# Patient Record
Sex: FEMALE | Race: ASIAN | HISPANIC OR LATINO | Employment: FULL TIME | URBAN - METROPOLITAN AREA
[De-identification: names, ages, dates, MRNs, and addresses within clinical notes are randomized per-mention and may not be internally consistent; named-entity substitution may affect disease eponyms.]

---

## 2017-03-28 ENCOUNTER — OFFICE VISIT (OUTPATIENT)
Dept: URGENT CARE | Facility: URGENT CARE | Age: 22
End: 2017-03-28
Payer: COMMERCIAL

## 2017-03-28 VITALS
SYSTOLIC BLOOD PRESSURE: 110 MMHG | DIASTOLIC BLOOD PRESSURE: 74 MMHG | TEMPERATURE: 98.2 F | HEART RATE: 80 BPM | OXYGEN SATURATION: 99 % | WEIGHT: 111.9 LBS

## 2017-03-28 DIAGNOSIS — N76.0 BV (BACTERIAL VAGINOSIS): ICD-10-CM

## 2017-03-28 DIAGNOSIS — R30.0 DYSURIA: Primary | ICD-10-CM

## 2017-03-28 DIAGNOSIS — B96.89 BV (BACTERIAL VAGINOSIS): ICD-10-CM

## 2017-03-28 LAB
BETA HCG QUAL IFA URINE: NEGATIVE
MICRO REPORT STATUS: ABNORMAL
SPECIMEN SOURCE: ABNORMAL
WET PREP SPEC: ABNORMAL

## 2017-03-28 PROCEDURE — 99213 OFFICE O/P EST LOW 20 MIN: CPT | Performed by: PHYSICIAN ASSISTANT

## 2017-03-28 PROCEDURE — 87210 SMEAR WET MOUNT SALINE/INK: CPT | Performed by: PHYSICIAN ASSISTANT

## 2017-03-28 PROCEDURE — 84703 CHORIONIC GONADOTROPIN ASSAY: CPT | Performed by: PHYSICIAN ASSISTANT

## 2017-03-28 PROCEDURE — 87491 CHLMYD TRACH DNA AMP PROBE: CPT | Performed by: PHYSICIAN ASSISTANT

## 2017-03-28 PROCEDURE — 87591 N.GONORRHOEAE DNA AMP PROB: CPT | Performed by: PHYSICIAN ASSISTANT

## 2017-03-28 RX ORDER — METRONIDAZOLE 500 MG/1
500 TABLET ORAL 2 TIMES DAILY
Qty: 14 TABLET | Refills: 0 | Status: SHIPPED | OUTPATIENT
Start: 2017-03-28 | End: 2017-06-01

## 2017-03-28 NOTE — MR AVS SNAPSHOT
After Visit Summary   3/28/2017    Josselyn Ltaif    MRN: [id number]           Patient Information     Date Of Birth          1995        Visit Information        Provider Department      3/28/2017 8:30 PM Mariela Olivo PA-C [facility]        Today's Diagnoses     Dysuria    -  1    BV (bacterial vaginosis)          Care Instructions      Bacterial Vaginosis    You have a vaginal infection called bacterial vaginosis (BV). Both good and bad bacteria are present in a healthy vagina. BV occurs when these bacteria get out of balance. The number of bad bacteria increase. And the number of good bacteria decrease.  BV may or may not cause symptoms. If symptoms do occur, they can include:    Thin, gray, milky-white, or sometimes green discharge    Unpleasant odor or  fishy  smell    Itching, burning, or pain in or around the vagina  It is not known what causes BV, but certain factors can make the problem more likely. This can include:    Douching    Having sex with a new partner    Having sex with more than one partner  BV will sometimes go away on its own. But treatment is usually recommended. This is because untreated BV can increase the risk of more serious health problems such as:    Pelvic inflammatory disease (PID)     delivery (giving birth to a baby early if you re pregnant)    HIV and certain other sexually transmitted diseases (STDs)    Infection after surgery on the reproductive organs  Home care  General care    BV is most often treated with medicines called antibiotics. These may be given as pills or as a vaginal cream. If antibiotics are prescribed, be sure to use them exactly as directed. Also, be sure to complete all of the medicine, even if your symptoms go away.    Avoid douching or having sex during treatment.    If you have sex with a female partner, ask your healthcare provider if she should also be treated.  Prevention    Limit or avoid  douching.    Avoid having sex. If you do have sex, then take steps to lower your risk:    Use condoms when having sex.    Limit the number of partners you have sex with.  Follow-up care  Follow up with your healthcare provider, or as advised.  When to seek medical advice  Call your healthcare provider right away if:    You have a fever of 100.4 F (38 C) or higher, or as directed by your provider.    Your symptoms worsen, or they don t go away within a few days of starting treatment.    You have new pain in the lower belly or pelvic region.    You have side effects that bother you or a reaction to the pills or cream you re prescribed.    You or any partners you have sex with have new symptoms, such as a rash, joint pain, or sores.    5673-6519 [employer]. [address] [place] [address]. All rights reserved. This information is not intended as a substitute for professional medical care. Always follow your healthcare professional's instructions.              Follow-ups after your visit        Who to contact     If you have questions or need follow up information about today's clinic visit or your schedule please contact [facility] directly at [phone].  Normal or non-critical lab and imaging results will be communicated to you by MyChart, letter or phone within 4 business days after the clinic has received the results. If you do not hear from us within 7 days, please contact the clinic through MyChart or phone. If you have a critical or abnormal lab result, we will notify you by phone as soon as possible.  Submit refill requests through [employer] or call your pharmacy and they will forward the refill request to us. Please allow 3 business days for your refill to be completed.          Additional Information About Your Visit        [employer] Information     [employer]use[employer] lets you send messages to your doctor, view your test results, renew your prescriptions, schedule  "appointments and more. To sign up, go to [email or web address] . Click on \"Log in\" on the left side of the screen, which will take you to the Welcome page. Then click on \"Sign up Now\" on the right side of the page.     You will be asked to enter the access code listed below, as well as some personal information. Please follow the directions to create your username and password.     Your access code is: [email or web address]  Expires: 2017  9:21 PM     Your access code will  in 90 days. If you need help or a new code, please call your [facility] clinic or [phone].        Care EveryWhere ID     This is your Care EveryWhere ID. This could be used by other organizations to access your [facility] medical records  [id number]        Your Vitals Were     Pulse Temperature Pulse Oximetry             80 98.2  F (36.8  C) (Oral) 99%          Blood Pressure from Last 3 Encounters:   17 110/74   16 96/62   16 109/73    Weight from Last 3 Encounters:   17 111 lb 14.4 oz (50.8 kg)   16 110 lb 8 oz (50.1 kg)   16 118 lb 5 oz (53.7 kg)              We Performed the Following     Beta HCG qual IFA urine     CHLAMYDIA TRACHOMATIS PCR     NEISSERIA GONORRHOEA PCR     Wet prep          Today's Medication Changes          These changes are accurate as of: 3/28/17  9:21 PM.  If you have any questions, ask your nurse or doctor.               Start taking these medicines.        Dose/Directions    metroNIDAZOLE 500 MG tablet   Commonly known as:  FLAGYL   Used for:  BV (bacterial vaginosis)   Started by:  Mariela Olivo PA-C        Dose:  500 mg   Take 1 tablet (500 mg) by mouth 2 times daily   Quantity:  14 tablet   Refills:  0            Where to get your medicines      These medications were sent to [facility] - [place], [place] - [address] AT [address], [facility] [address]    Hours:  24-hours Phone:  [phone]     " metroNIDAZOLE 500 MG tablet                Primary Care Provider    None Specified       No primary provider on file.        Thank you!     Thank you for choosing [facility]  for your care. Our goal is always to provide you with excellent care. Hearing back from our patients is one way we can continue to improve our services. Please take a few minutes to complete the written survey that you may receive in the mail after your visit with us. Thank you!             Your Updated Medication List - Protect others around you: Learn how to safely use, store and throw away your medicines at www.disposemymeds.org.          This list is accurate as of: 3/28/17  9:21 PM.  Always use your most recent med list.                   Brand Name Dispense Instructions for use    acetaminophen-codeine 300-30 MG per tablet    TYLENOL/codeine #3    10 tablet    Take 1-2 tablets by mouth every 4 hours as needed       cefdinir 300 MG capsule    OMNICEF    20 capsule    Take 1 capsule (300 mg) by mouth 2 times daily       ibuprofen 800 MG tablet    ADVIL/MOTRIN    60 tablet    Take 1 tablet (800 mg) by mouth every 8 hours as needed for moderate pain       metroNIDAZOLE 500 MG tablet    FLAGYL    14 tablet    Take 1 tablet (500 mg) by mouth 2 times daily       nitrofurantoin (macrocrystal-monohydrate) 100 MG capsule    MACROBID    14 capsule    Take 1 capsule (100 mg) by mouth 2 times daily       phenazopyridine 200 MG tablet    PYRIDIUM    6 tablet    Take 1 tablet (200 mg) by mouth 3 times daily as needed for irritation Expect your urine to appear bright orange

## 2017-03-29 NOTE — NURSING NOTE
Chief Complaint   Patient presents with     Vaginal Problem     Discharge and odor x 2 days. Pt noticed blood last night after intercourse.        Initial /74 (BP Location: Left arm, Patient Position: Chair, Cuff Size: Adult Regular)  Pulse 80  Temp 98.2  F (36.8  C) (Oral)  Wt 111 lb 14.4 oz (50.8 kg)  SpO2 99% There is no height or weight on file to calculate BMI.  Medication Reconciliation: complete

## 2017-03-29 NOTE — PROGRESS NOTES
SUBJECTIVE:  Josselyn Latif is a 21 year old female who presents with vaginal discharge.    Onset of symptoms 2 day(s) ago, unchanged since.     Pain:Yes    Vaginal bleeding: Yes      Vaginal symptoms: discharge described as malodorous  LMP -- 2 weeks ago.   Sexually active: yes, same partner  Predisposing factors: oral contraceptives -- patient was recently seen by OB who performed pelvic exam along with STD testing.   Hx of previous symptom: none    No past medical history on file.  Current Outpatient Prescriptions   Medication Sig Dispense Refill     metroNIDAZOLE (FLAGYL) 500 MG tablet Take 1 tablet (500 mg) by mouth 2 times daily 14 tablet 0     ibuprofen (ADVIL,MOTRIN) 800 MG tablet Take 1 tablet (800 mg) by mouth every 8 hours as needed for moderate pain (Patient not taking: Reported on 3/28/2017) 60 tablet 0     cefdinir (OMNICEF) 300 MG capsule Take 1 capsule (300 mg) by mouth 2 times daily (Patient not taking: Reported on 3/28/2017) 20 capsule 0     acetaminophen-codeine (TYLENOL/CODEINE #3) 300-30 MG per tablet Take 1-2 tablets by mouth every 4 hours as needed (Patient not taking: Reported on 3/28/2017) 10 tablet 0     nitrofurantoin, macrocrystal-monohydrate, (MACROBID) 100 MG capsule Take 1 capsule (100 mg) by mouth 2 times daily (Patient not taking: Reported on 3/28/2017) 14 capsule 0     phenazopyridine (PYRIDIUM) 200 MG tablet Take 1 tablet (200 mg) by mouth 3 times daily as needed for irritation Expect your urine to appear bright orange (Patient not taking: Reported on 3/28/2017) 6 tablet 0     Social History     Social History     Marital status: Single     Spouse name: N/A     Number of children: N/A     Years of education: N/A     Occupational History     Not on file.     Social History Main Topics     Smoking status: Former Smoker     Smokeless tobacco: Never Used     Alcohol use Not on file     Drug use: Not on file     Sexual activity: Not on file     Other Topics Concern     Not on file      Social History Narrative       ROS:   Review of systems negative except as stated above.    OBJECTIVE:  /74 (BP Location: Left arm, Patient Position: Chair, Cuff Size: Adult Regular)  Pulse 80  Temp 98.2  F (36.8  C) (Oral)  Wt 111 lb 14.4 oz (50.8 kg)  SpO2 99%  Patient deferred pelvic exam  GENERAL APPEARANCE: healthy, alert and no distress  CV: regular rates and rhythm, normal S1 S2, no murmur noted  ABDOMEN:  soft, nontender, no HSM or masses and bowel sounds normal  BACK: No CVA tenderness  SKIN: no suspicious lesions or rashes    ASSESSMENT/PLAN:  1. Dysuria  Will call if STD labs are positive  - Beta HCG qual IFA urine  - Wet prep  - NEISSERIA GONORRHOEA PCR  - CHLAMYDIA TRACHOMATIS PCR    2. BV (bacterial vaginosis)  - metroNIDAZOLE (FLAGYL) 500 MG tablet; Take 1 tablet (500 mg) by mouth 2 times daily  Dispense: 14 tablet; Refill: 0    I have discussed any lab or imaging results, the patient's diagnosis, and my plan of treatment with the patient and/or family. Patient is aware to come back in with worsening symptoms or if no relief despite treatment plan.  Patient voiced understanding and had no further questions.   Mariela Olivo PA-C

## 2017-03-29 NOTE — PATIENT INSTRUCTIONS
Bacterial Vaginosis    You have a vaginal infection called bacterial vaginosis (BV). Both good and bad bacteria are present in a healthy vagina. BV occurs when these bacteria get out of balance. The number of bad bacteria increase. And the number of good bacteria decrease.  BV may or may not cause symptoms. If symptoms do occur, they can include:    Thin, gray, milky-white, or sometimes green discharge    Unpleasant odor or  fishy  smell    Itching, burning, or pain in or around the vagina  It is not known what causes BV, but certain factors can make the problem more likely. This can include:    Douching    Having sex with a new partner    Having sex with more than one partner  BV will sometimes go away on its own. But treatment is usually recommended. This is because untreated BV can increase the risk of more serious health problems such as:    Pelvic inflammatory disease (PID)     delivery (giving birth to a baby early if you re pregnant)    HIV and certain other sexually transmitted diseases (STDs)    Infection after surgery on the reproductive organs  Home care  General care    BV is most often treated with medicines called antibiotics. These may be given as pills or as a vaginal cream. If antibiotics are prescribed, be sure to use them exactly as directed. Also, be sure to complete all of the medicine, even if your symptoms go away.    Avoid douching or having sex during treatment.    If you have sex with a female partner, ask your healthcare provider if she should also be treated.  Prevention    Limit or avoid douching.    Avoid having sex. If you do have sex, then take steps to lower your risk:    Use condoms when having sex.    Limit the number of partners you have sex with.  Follow-up care  Follow up with your healthcare provider, or as advised.  When to seek medical advice  Call your healthcare provider right away if:    You have a fever of 100.4 F (38 C) or higher, or as directed by your  provider.    Your symptoms worsen, or they don t go away within a few days of starting treatment.    You have new pain in the lower belly or pelvic region.    You have side effects that bother you or a reaction to the pills or cream you re prescribed.    You or any partners you have sex with have new symptoms, such as a rash, joint pain, or sores.    8641-2403 [employer]. [address] [place] [address], [place] [address]. All rights reserved. This information is not intended as a substitute for professional medical care. Always follow your healthcare professional's instructions.

## 2017-03-30 ENCOUNTER — TELEPHONE (OUTPATIENT)
Dept: URGENT CARE | Facility: URGENT CARE | Age: 22
End: 2017-03-30

## 2017-03-30 LAB
C TRACH DNA SPEC QL NAA+PROBE: ABNORMAL
N GONORRHOEA DNA SPEC QL NAA+PROBE: NORMAL
SPECIMEN SOURCE: ABNORMAL
SPECIMEN SOURCE: NORMAL

## 2017-04-06 ENCOUNTER — OFFICE VISIT (OUTPATIENT)
Dept: URGENT CARE | Facility: URGENT CARE | Age: 22
End: 2017-04-06
Payer: COMMERCIAL

## 2017-04-06 VITALS
TEMPERATURE: 98.8 F | OXYGEN SATURATION: 100 % | DIASTOLIC BLOOD PRESSURE: 68 MMHG | HEART RATE: 79 BPM | WEIGHT: 109.8 LBS | SYSTOLIC BLOOD PRESSURE: 108 MMHG

## 2017-04-06 DIAGNOSIS — N89.8 VAGINAL ITCHING: ICD-10-CM

## 2017-04-06 DIAGNOSIS — B37.31 CANDIDIASIS OF VAGINA: Primary | ICD-10-CM

## 2017-04-06 LAB
MICRO REPORT STATUS: ABNORMAL
SPECIMEN SOURCE: ABNORMAL
WET PREP SPEC: ABNORMAL

## 2017-04-06 PROCEDURE — 99213 OFFICE O/P EST LOW 20 MIN: CPT | Performed by: INTERNAL MEDICINE

## 2017-04-06 PROCEDURE — 87210 SMEAR WET MOUNT SALINE/INK: CPT | Performed by: INTERNAL MEDICINE

## 2017-04-06 RX ORDER — FLUCONAZOLE 150 MG/1
150 TABLET ORAL ONCE
Qty: 1 TABLET | Refills: 0 | Status: SHIPPED | OUTPATIENT
Start: 2017-04-06 | End: 2017-04-06

## 2017-04-06 RX ORDER — DROSPIRENONE AND ETHINYL ESTRADIOL 0.02-3(28)
KIT ORAL DAILY
COMMUNITY
Start: 2017-01-16 | End: 2020-08-18

## 2017-04-06 NOTE — MR AVS SNAPSHOT
"              After Visit Summary   2017    Josselyn Latif    MRN: [id number]           Patient Information     Date Of Birth          1995        Visit Information        Provider Department      2017 6:15 PM Jose Urena MD [facility]        Today's Diagnoses     Candidiasis of vagina    -  1    Vaginal itching           Follow-ups after your visit        Who to contact     If you have questions or need follow up information about today's clinic visit or your schedule please contact [facility] directly at [phone].  Normal or non-critical lab and imaging results will be communicated to you by [employer], letter or phone within 4 business days after the clinic has received the results. If you do not hear from us within 7 days, please contact the clinic through [employer] or phone. If you have a critical or abnormal lab result, we will notify you by phone as soon as possible.  Submit refill requests through [employer] or call your pharmacy and they will forward the refill request to us. Please allow 3 business days for your refill to be completed.          Additional Information About Your Visit        MyChart Information     [employer] lets you send messages to your doctor, view your test results, renew your prescriptions, schedule appointments and more. To sign up, go to [email or web address] . Click on \"Log in\" on the left side of the screen, which will take you to the Welcome page. Then click on \"Sign up Now\" on the right side of the page.     You will be asked to enter the access code listed below, as well as some personal information. Please follow the directions to create your username and password.     Your access code is: [email or web address]  Expires: 2017  9:21 PM     Your access code will  in 90 days. If you need help or a new code, please call your [place] clinic or [phone].        Care EveryWhere ID     This is your Care " EveryWhere ID. This could be used by other organizations to access your [place] medical records  [id number]        Your Vitals Were     Pulse Temperature Pulse Oximetry             79 98.8  F (37.1  C) (Oral) 100%          Blood Pressure from Last 3 Encounters:   04/06/17 108/68   03/28/17 110/74   11/02/16 96/62    Weight from Last 3 Encounters:   04/06/17 109 lb 12.8 oz (49.8 kg)   03/28/17 111 lb 14.4 oz (50.8 kg)   11/02/16 110 lb 8 oz (50.1 kg)              We Performed the Following     Wet prep          Today's Medication Changes          These changes are accurate as of: 4/6/17  9:50 PM.  If you have any questions, ask your nurse or doctor.               Start taking these medicines.        Dose/Directions    fluconazole 150 MG tablet   Commonly known as:  DIFLUCAN   Used for:  Candidiasis of vagina   Started by:  Jose Urena MD        Dose:  150 mg   Take 1 tablet (150 mg) by mouth once for 1 dose   Quantity:  1 tablet   Refills:  0            Where to get your medicines      Some of these will need a paper prescription and others can be bought over the counter.  Ask your nurse if you have questions.     Bring a paper prescription for each of these medications     fluconazole 150 MG tablet                Primary Care Provider    None Specified       No primary provider on file.        Thank you!     Thank you for choosing [facility]  for your care. Our goal is always to provide you with excellent care. Hearing back from our patients is one way we can continue to improve our services. Please take a few minutes to complete the written survey that you may receive in the mail after your visit with us. Thank you!             Your Updated Medication List - Protect others around you: Learn how to safely use, store and throw away your medicines at www.disposemymeds.org.          This list is accurate as of: 4/6/17  9:50 PM.  Always use your most recent med list.                    Brand Name Dispense Instructions for use    acetaminophen-codeine 300-30 MG per tablet    TYLENOL/codeine #3    10 tablet    Take 1-2 tablets by mouth every 4 hours as needed       cefdinir 300 MG capsule    OMNICEF    20 capsule    Take 1 capsule (300 mg) by mouth 2 times daily       drospirenone-ethinyl estradiol 3-0.02 MG per tablet    YOMAIRA         fluconazole 150 MG tablet    DIFLUCAN    1 tablet    Take 1 tablet (150 mg) by mouth once for 1 dose       ibuprofen 800 MG tablet    ADVIL/MOTRIN    60 tablet    Take 1 tablet (800 mg) by mouth every 8 hours as needed for moderate pain       metroNIDAZOLE 500 MG tablet    FLAGYL    14 tablet    Take 1 tablet (500 mg) by mouth 2 times daily       nitrofurantoin (macrocrystal-monohydrate) 100 MG capsule    MACROBID    14 capsule    Take 1 capsule (100 mg) by mouth 2 times daily       phenazopyridine 200 MG tablet    PYRIDIUM    6 tablet    Take 1 tablet (200 mg) by mouth 3 times daily as needed for irritation Expect your urine to appear bright orange

## 2017-04-07 NOTE — NURSING NOTE
Chief Complaint   Patient presents with     Medication Problem     Pt was prescribed azithromycin and metronidazole for chlamidia. Pt states that her vaginal area started itching right after taking the medicaiton.        Initial /68 (BP Location: Right arm, Patient Position: Chair, Cuff Size: Adult Regular)  Pulse 79  Temp 98.8  F (37.1  C) (Oral)  Wt 109 lb 12.8 oz (49.8 kg)  SpO2 100% There is no height or weight on file to calculate BMI.  Medication Reconciliation: complete

## 2017-04-07 NOTE — PROGRESS NOTES
SUBJECTIVE:  Josselyn Latif, a 21 year old female, presents for evaluation of vaginal irritation.  She was treated for BV last week for a milky discharge.  Then had a positive chlamydia.  Treated this with azithromycin.  This led to itchiness.  She still is seeing some milky discharge and a little blood.  She is at the end of her OCP cycle.     OBJECTIVE:  /68 (BP Location: Right arm, Patient Position: Chair, Cuff Size: Adult Regular)  Pulse 79  Temp 98.8  F (37.1  C) (Oral)  Wt 109 lb 12.8 oz (49.8 kg)  SpO2 100%  GENERAL: healthy, alert and no distress  GU_female: thin, white vaginal discharge with scattered white mucosal plaques    LAB: wet prep shows yeast; clue cells and trich are negative.    ASSESSMENT/PLAN:    ICD-10-CM    1. Candidiasis of vagina B37.3 fluconazole (DIFLUCAN) 150 MG tablet    Given her multiple vaginal pathogens over the past month, we had an extensive discussion with the patient and her mother relative to safe sex and also discussed the microbiology and pathophysiology of chlamydia, bacterial vaginosis and vaginal candidiasis.  Faithful condom use is critical for prevention in terms of STIs (and can help with BV).  Avoiding systemic antibiotics.  No douching.     2. Vaginal itching L29.8 Wet prep       Total time spent face-to-face with the patient/family was 20 minutes of which 15 minutes were spent in counseling and care coordination.  Discussions focussed on the above documented medical issues.    Jose Urena MD

## 2017-06-01 ENCOUNTER — OFFICE VISIT (OUTPATIENT)
Dept: URGENT CARE | Facility: URGENT CARE | Age: 22
End: 2017-06-01
Payer: COMMERCIAL

## 2017-06-01 VITALS
WEIGHT: 110 LBS | DIASTOLIC BLOOD PRESSURE: 77 MMHG | HEART RATE: 76 BPM | SYSTOLIC BLOOD PRESSURE: 105 MMHG | TEMPERATURE: 97.9 F

## 2017-06-01 DIAGNOSIS — N76.0 ACUTE VAGINITIS: Primary | ICD-10-CM

## 2017-06-01 LAB
ALBUMIN UR-MCNC: NEGATIVE MG/DL
APPEARANCE UR: CLEAR
BACTERIA #/AREA URNS HPF: ABNORMAL /HPF
BILIRUB UR QL STRIP: NEGATIVE
COLOR UR AUTO: YELLOW
GLUCOSE UR STRIP-MCNC: NEGATIVE MG/DL
HGB UR QL STRIP: ABNORMAL
KETONES UR STRIP-MCNC: NEGATIVE MG/DL
LEUKOCYTE ESTERASE UR QL STRIP: ABNORMAL
MICRO REPORT STATUS: ABNORMAL
MUCOUS THREADS #/AREA URNS LPF: PRESENT /LPF
NITRATE UR QL: NEGATIVE
NON-SQ EPI CELLS #/AREA URNS LPF: ABNORMAL /LPF
PH UR STRIP: 6.5 PH (ref 5–7)
RBC #/AREA URNS AUTO: ABNORMAL /HPF (ref 0–2)
SP GR UR STRIP: 1.02 (ref 1–1.03)
SPECIMEN SOURCE: ABNORMAL
URN SPEC COLLECT METH UR: ABNORMAL
UROBILINOGEN UR STRIP-ACNC: 0.2 EU/DL (ref 0.2–1)
WBC #/AREA URNS AUTO: ABNORMAL /HPF (ref 0–2)
WET PREP SPEC: ABNORMAL

## 2017-06-01 PROCEDURE — 87491 CHLMYD TRACH DNA AMP PROBE: CPT | Performed by: PHYSICIAN ASSISTANT

## 2017-06-01 PROCEDURE — 99213 OFFICE O/P EST LOW 20 MIN: CPT | Performed by: PHYSICIAN ASSISTANT

## 2017-06-01 PROCEDURE — 87210 SMEAR WET MOUNT SALINE/INK: CPT | Performed by: PHYSICIAN ASSISTANT

## 2017-06-01 PROCEDURE — 87591 N.GONORRHOEAE DNA AMP PROB: CPT | Performed by: PHYSICIAN ASSISTANT

## 2017-06-01 PROCEDURE — 81001 URINALYSIS AUTO W/SCOPE: CPT | Performed by: PHYSICIAN ASSISTANT

## 2017-06-01 RX ORDER — FLUCONAZOLE 150 MG/1
150 TABLET ORAL ONCE
Qty: 1 TABLET | Refills: 0 | Status: SHIPPED | OUTPATIENT
Start: 2017-06-01 | End: 2017-06-01

## 2017-06-01 RX ORDER — METRONIDAZOLE 500 MG/1
500 TABLET ORAL 2 TIMES DAILY
Qty: 14 TABLET | Refills: 0 | Status: SHIPPED | OUTPATIENT
Start: 2017-06-01 | End: 2017-06-26

## 2017-06-01 NOTE — NURSING NOTE
Chief Complaint   Patient presents with     Vaginal Discharge     vaginal discharge for one week.        Initial /77 (BP Location: Right arm, Patient Position: Chair, Cuff Size: Adult Regular)  Pulse 76  Temp 97.9  F (36.6  C) (Oral)  Wt 110 lb (49.9 kg)  LMP 05/30/2017  Breastfeeding? No There is no height or weight on file to calculate BMI.  Medication Reconciliation: complete

## 2017-06-01 NOTE — MR AVS SNAPSHOT
"              After Visit Summary   2017    Josselyn Latif    MRN: [id number]           Patient Information     Date Of Birth          1995        Visit Information        Provider Department      2017 5:40 PM Sho Cesar PA-C [facility]        Today's Diagnoses     Acute vaginitis    -  1       Follow-ups after your visit        Who to contact     If you have questions or need follow up information about today's clinic visit or your schedule please contact [facility] directly at [phone].  Normal or non-critical lab and imaging results will be communicated to you by Autonomic Networkshart, letter or phone within 4 business days after the clinic has received the results. If you do not hear from us within 7 days, please contact the clinic through Aut[employer] Networks[employer] or phone. If you have a critical or abnormal lab result, we will notify you by phone as soon as possible.  Submit refill requests through Knowta or call your pharmacy and they will forward the refill request to us. Please allow 3 business days for your refill to be completed.          Additional Information About Your Visit        MyChart Information     Knowta lets you send messages to your doctor, view your test results, renew your prescriptions, schedule appointments and more. To sign up, go to [email or web address] . Click on \"Log in\" on the left side of the screen, which will take you to the Welcome page. Then click on \"Sign up Now\" on the right side of the page.     You will be asked to enter the access code listed below, as well as some personal information. Please follow the directions to create your username and password.     Your access code is: [email or web address]  Expires: 2017  9:21 PM     Your access code will  in 90 days. If you need help or a new code, please call your [place] clinic or [phone].        Care EveryWhere ID     This is your Care EveryWhere ID. This " could be used by other organizations to access your [place] medical records  [id number]        Your Vitals Were     Pulse Temperature Last Period Breastfeeding?          76 97.9  F (36.6  C) (Oral) 05/30/2017 No         Blood Pressure from Last 3 Encounters:   06/01/17 105/77   04/06/17 108/68   03/28/17 110/74    Weight from Last 3 Encounters:   06/01/17 110 lb (49.9 kg)   04/06/17 109 lb 12.8 oz (49.8 kg)   03/28/17 111 lb 14.4 oz (50.8 kg)              We Performed the Following     CHLAMYDIA TRACHOMATIS PCR     NEISSERIA GONORRHOEA PCR     UA with Microscopic reflex to Culture     Wet prep          Today's Medication Changes          These changes are accurate as of: 6/1/17 10:10 PM.  If you have any questions, ask your nurse or doctor.               Start taking these medicines.        Dose/Directions    fluconazole 150 MG tablet   Commonly known as:  DIFLUCAN   Used for:  Acute vaginitis   Started by:  Sho Cesar PA-C        Dose:  150 mg   Take 1 tablet (150 mg) by mouth once for 1 dose   Quantity:  1 tablet   Refills:  0       metroNIDAZOLE 500 MG tablet   Commonly known as:  FLAGYL   Used for:  Acute vaginitis   Started by:  Sho Cesar PA-C        Dose:  500 mg   Take 1 tablet (500 mg) by mouth 2 times daily   Quantity:  14 tablet   Refills:  0            Where to get your medicines      These medications were sent to [facility] [address] [address]     Phone:  [phone]     fluconazole 150 MG tablet    metroNIDAZOLE 500 MG tablet                Primary Care Provider    None Specified       No primary provider on file.        Thank you!     Thank you for choosing [facility]  for your care. Our goal is always to provide you with excellent care. Hearing back from our patients is one way we can continue to improve our services. Please take a few minutes to complete the  written survey that you may receive in the mail after your visit with us. Thank you!             Your Updated Medication List - Protect others around you: Learn how to safely use, store and throw away your medicines at www.disposemymeds.org.          This list is accurate as of: 6/1/17 10:10 PM.  Always use your most recent med list.                   Brand Name Dispense Instructions for use    drospirenone-ethinyl estradiol 3-0.02 MG per tablet    YOMAIRA         fluconazole 150 MG tablet    DIFLUCAN    1 tablet    Take 1 tablet (150 mg) by mouth once for 1 dose       ibuprofen 800 MG tablet    ADVIL/MOTRIN    60 tablet    Take 1 tablet (800 mg) by mouth every 8 hours as needed for moderate pain       metroNIDAZOLE 500 MG tablet    FLAGYL    14 tablet    Take 1 tablet (500 mg) by mouth 2 times daily

## 2017-06-02 NOTE — PROGRESS NOTES
SUBJECTIVE:  Josselyn Latif is a 21 year old female who presents with vaginal discharge and itching for the past week.    Menses started a few days ago.     Recently finished antibiotic for chlamydia.     Has a new partner for the past week.  Didn't use condoms one time.     Vaginal bleeding: on menses      Vaginal symptoms: as above  Patient's last menstrual period was 05/30/2017.    Sexually active: yes  Predisposing factors: multiple sexual partners and not using barrier contraception consistently    No past medical history on file.  Current Outpatient Prescriptions   Medication Sig Dispense Refill     fluconazole (DIFLUCAN) 150 MG tablet Take 1 tablet (150 mg) by mouth once for 1 dose 1 tablet 0     drospirenone-ethinyl estradiol (YOMAIRA) 3-0.02 MG per tablet        ibuprofen (ADVIL,MOTRIN) 800 MG tablet Take 1 tablet (800 mg) by mouth every 8 hours as needed for moderate pain (Patient not taking: Reported on 3/28/2017) 60 tablet 0     Social History     Social History     Marital status: Single     Spouse name: N/A     Number of children: N/A     Years of education: N/A     Occupational History     Not on file.     Social History Main Topics     Smoking status: Former Smoker     Smokeless tobacco: Never Used     Alcohol use Not on file     Drug use: Not on file     Sexual activity: Not on file     Other Topics Concern     Not on file     Social History Narrative       ROS:   CONSTITUTIONAL:NEGATIVE for fever, chills, change in weight  INTEGUMENTARY/SKIN: NEGATIVE for worrisome rashes, moles or lesions  GI: NEGATIVE for nausea, abdominal pain, heartburn, or change in bowel habits  : as per HPI    OBJECTIVE:  /77 (BP Location: Right arm, Patient Position: Chair, Cuff Size: Adult Regular)  Pulse 76  Temp 97.9  F (36.6  C) (Oral)  Wt 110 lb (49.9 kg)  LMP 05/30/2017  Breastfeeding? No  : deferred by patient.  Did self wet prep  GENERAL APPEARANCE: healthy, alert and no distress  ABDOMEN:  soft,  nontender, no HSM or masses and bowel sounds normal  BACK: No CVA tenderness    (N76.0) Acute vaginitis  (primary encounter diagnosis)  Comment:   Plan: UA with Microscopic reflex to Culture,         NEISSERIA GONORRHOEA PCR, CHLAMYDIA TRACHOMATIS        PCR, fluconazole (DIFLUCAN) 150 MG tablet, Wet         prep, metroNIDAZOLE (FLAGYL) 500 MG tablet          Strongly advised to use condoms.      Patient is here with her mother today, who has been present through the entire exam.

## 2017-06-04 ENCOUNTER — TELEPHONE (OUTPATIENT)
Dept: URGENT CARE | Facility: URGENT CARE | Age: 22
End: 2017-06-04

## 2017-06-04 DIAGNOSIS — A74.9 CHLAMYDIA INFECTION: Primary | ICD-10-CM

## 2017-06-04 RX ORDER — AZITHROMYCIN 500 MG/1
1000 TABLET, FILM COATED ORAL ONCE
Qty: 2 TABLET | Refills: 0
Start: 2017-06-04 | End: 2017-06-04

## 2017-06-04 NOTE — TELEPHONE ENCOUNTER
Please inform pt of positive Chlamydia and call in Zithromax 1gram. Partner needs to be treated as well.

## 2017-06-04 NOTE — TELEPHONE ENCOUNTER
Patient was notified of lab results pre providers note. Prescription called in to [place] pharmacy per patients request.     [facility] form faxed.

## 2017-06-26 ENCOUNTER — OFFICE VISIT (OUTPATIENT)
Dept: URGENT CARE | Facility: URGENT CARE | Age: 22
End: 2017-06-26
Payer: COMMERCIAL

## 2017-06-26 VITALS
DIASTOLIC BLOOD PRESSURE: 68 MMHG | OXYGEN SATURATION: 100 % | SYSTOLIC BLOOD PRESSURE: 104 MMHG | TEMPERATURE: 98.3 F | HEART RATE: 75 BPM | WEIGHT: 112.5 LBS

## 2017-06-26 DIAGNOSIS — Z86.19 HISTORY OF CHLAMYDIA INFECTION: Primary | ICD-10-CM

## 2017-06-26 DIAGNOSIS — Z11.3 SCREEN FOR STD (SEXUALLY TRANSMITTED DISEASE): ICD-10-CM

## 2017-06-26 DIAGNOSIS — B37.31 CANDIDIASIS OF VAGINA: ICD-10-CM

## 2017-06-26 LAB
MICRO REPORT STATUS: ABNORMAL
SPECIMEN SOURCE: ABNORMAL
WET PREP SPEC: ABNORMAL

## 2017-06-26 PROCEDURE — 86803 HEPATITIS C AB TEST: CPT | Performed by: PHYSICIAN ASSISTANT

## 2017-06-26 PROCEDURE — 36415 COLL VENOUS BLD VENIPUNCTURE: CPT | Performed by: PHYSICIAN ASSISTANT

## 2017-06-26 PROCEDURE — 86706 HEP B SURFACE ANTIBODY: CPT | Performed by: PHYSICIAN ASSISTANT

## 2017-06-26 PROCEDURE — 87591 N.GONORRHOEAE DNA AMP PROB: CPT | Performed by: PHYSICIAN ASSISTANT

## 2017-06-26 PROCEDURE — 87340 HEPATITIS B SURFACE AG IA: CPT | Performed by: PHYSICIAN ASSISTANT

## 2017-06-26 PROCEDURE — 87210 SMEAR WET MOUNT SALINE/INK: CPT | Performed by: PHYSICIAN ASSISTANT

## 2017-06-26 PROCEDURE — 87491 CHLMYD TRACH DNA AMP PROBE: CPT | Performed by: PHYSICIAN ASSISTANT

## 2017-06-26 PROCEDURE — 87389 HIV-1 AG W/HIV-1&-2 AB AG IA: CPT | Performed by: PHYSICIAN ASSISTANT

## 2017-06-26 PROCEDURE — 99214 OFFICE O/P EST MOD 30 MIN: CPT | Performed by: PHYSICIAN ASSISTANT

## 2017-06-26 RX ORDER — FLUCONAZOLE 150 MG/1
150 TABLET ORAL ONCE
Qty: 1 TABLET | Refills: 0 | Status: SHIPPED | OUTPATIENT
Start: 2017-06-26 | End: 2017-06-26

## 2017-06-26 NOTE — MR AVS SNAPSHOT
"              After Visit Summary   2017    Josselyn Latif    MRN: [id number]           Patient Information     Date Of Birth          1995        Visit Information        Provider Department      2017 7:05 PM Mesfin Braden PA-C [facility]        Today's Diagnoses     History of chlamydia infection    -  1    Screen for STD (sexually transmitted disease)        Candidiasis of vagina           Follow-ups after your visit        Who to contact     If you have questions or need follow up information about today's clinic visit or your schedule please contact [facility] [place] directly at [phone].  Normal or non-critical lab and imaging results will be communicated to you by MobileReactorhart, letter or phone within 4 business days after the clinic has received the results. If you do not hear from us within 7 days, please contact the clinic through MobileReactorhart or phone. If you have a critical or abnormal lab result, we will notify you by phone as soon as possible.  Submit refill requests through [employer] or call your pharmacy and they will forward the refill request to us. Please allow 3 business days for your refill to be completed.          Additional Information About Your Visit        MyChart Information     [employer] lets you send messages to your doctor, view your test results, renew your prescriptions, schedule appointments and more. To sign up, go to [email or web address] . Click on \"Log in\" on the left side of the screen, which will take you to the Welcome page. Then click on \"Sign up Now\" on the right side of the page.     You will be asked to enter the access code listed below, as well as some personal information. Please follow the directions to create your username and password.     Your access code is: [email or web address]  Expires: 2017 10:18 AM     Your access code will  in 90 days. If you need help or a new code, please call your [place] " clinic or [phone].        Care EveryWhere ID     This is your Care EveryWhere ID. This could be used by other organizations to access your [place] medical records  [id number]        Your Vitals Were     Pulse Temperature Last Period Pulse Oximetry          75 98.3  F (36.8  C) (Oral) 05/30/2017 100%         Blood Pressure from Last 3 Encounters:   06/26/17 104/68   06/01/17 105/77   04/06/17 108/68    Weight from Last 3 Encounters:   06/26/17 112 lb 8 oz (51 kg)   06/01/17 110 lb (49.9 kg)   04/06/17 109 lb 12.8 oz (49.8 kg)              We Performed the Following     CHLAMYDIA TRACHOMATIS PCR     Hepatitis B Surface Antibody     Hepatitis B surface antigen     Hepatitis C antibody     HIV Antigen Antibody Combo     NEISSERIA GONORRHOEA PCR     WET PREP          Today's Medication Changes          These changes are accurate as of: 6/26/17 11:59 PM.  If you have any questions, ask your nurse or doctor.               Start taking these medicines.        Dose/Directions    fluconazole 150 MG tablet   Commonly known as:  DIFLUCAN   Used for:  Candidiasis of vagina   Started by:  Mesfin Braden PA-C        Dose:  150 mg   Take 1 tablet (150 mg) by mouth once for 1 dose   Quantity:  1 tablet   Refills:  0            Where to get your medicines      Some of these will need a paper prescription and others can be bought over the counter.  Ask your nurse if you have questions.     Bring a paper prescription for each of these medications     fluconazole 150 MG tablet                Primary Care Provider    None Specified       No primary provider on file.        Equal Access to Services     [facility] Hadii rosanna [place], fam kaur, ramón latif. So [facility] [phone].    ATENCIÓN: Si habla español, tiene a darden disposición servicios gratuitos de asistencia lingüística. Llame al [phone].    We comply with applicable federal civil rights  laws and [place] laws. We do not discriminate on the basis of race, color, national origin, age, disability sex, sexual orientation or gender identity.            Thank you!     Thank you for choosing [facility]  for your care. Our goal is always to provide you with excellent care. Hearing back from our patients is one way we can continue to improve our services. Please take a few minutes to complete the written survey that you may receive in the mail after your visit with us. Thank you!             Your Updated Medication List - Protect others around you: Learn how to safely use, store and throw away your medicines at www.disposemymeds.org.          This list is accurate as of: 6/26/17 11:59 PM.  Always use your most recent med list.                   Brand Name Dispense Instructions for use Diagnosis    drospirenone-ethinyl estradiol 3-0.02 MG per tablet    YOMAIRA          fluconazole 150 MG tablet    DIFLUCAN    1 tablet    Take 1 tablet (150 mg) by mouth once for 1 dose    Candidiasis of vagina       ibuprofen 800 MG tablet    ADVIL/MOTRIN    60 tablet    Take 1 tablet (800 mg) by mouth every 8 hours as needed for moderate pain    Throat pain

## 2017-06-27 LAB
HBV SURFACE AB SERPL IA-ACNC: 1.42 M[IU]/ML
HBV SURFACE AG SERPL QL IA: NONREACTIVE
HCV AB SERPL QL IA: NORMAL
HIV 1+2 AB+HIV1 P24 AG SERPL QL IA: NORMAL

## 2017-06-27 NOTE — NURSING NOTE
Chief Complaint   Patient presents with     Vaginal Problem     had chlamydia, yeast infection and BV - was dx'd on 06/01/17 - wants a check to make sure it's gone - no longer has discharge, no pain or itching - mother is with pt today        Initial /68  Pulse 75  Temp 98.3  F (36.8  C) (Oral)  Wt 51 kg (112 lb 8 oz)  LMP 05/30/2017  SpO2 100% There is no height or weight on file to calculate BMI.  Medication Reconciliation: complete

## 2017-06-27 NOTE — PROGRESS NOTES
SUBJECTIVE:  Josselyn Latif is a 21 year old female who presents with needing to have STD testing down and having mild vaginal discharge, itching  She is not pregnant.    Onset of symptoms few days ago, unchanged since.     Pain:none.     Vaginal bleeding: No      Vaginal symptoms: itching  Patient's last menstrual period was 05/30/2017.    Sexually active: yes  Predisposing factors: hx of STD  Hx of previous symptom: some    No past medical history on file.  Current Outpatient Prescriptions   Medication Sig Dispense Refill     drospirenone-ethinyl estradiol (YOMAIRA) 3-0.02 MG per tablet        ibuprofen (ADVIL,MOTRIN) 800 MG tablet Take 1 tablet (800 mg) by mouth every 8 hours as needed for moderate pain (Patient not taking: Reported on 3/28/2017) 60 tablet 0     Social History     Social History     Marital status: Single     Spouse name: N/A     Number of children: N/A     Years of education: N/A     Occupational History     Not on file.     Social History Main Topics     Smoking status: Former Smoker     Smokeless tobacco: Never Used     Alcohol use Not on file     Drug use: Not on file     Sexual activity: Not on file     Other Topics Concern     Not on file     Social History Narrative       ROS:   CONSTITUTIONAL:NEGATIVE for fever, chills, change in weight  INTEGUMENTARY/SKIN: NEGATIVE for worrisome rashes, moles or lesions  GI: NEGATIVE for nausea, abdominal pain, heartburn, or change in bowel habits  : Positive for vaginal itching  MUSCULOSKELETAL: NEGATIVE for significant arthralgias or myalgia  NEURO: NEGATIVE for weakness, dizziness or paresthesias    OBJECTIVE:  /68  Pulse 75  Temp 98.3  F (36.8  C) (Oral)  Wt 112 lb 8 oz (51 kg)  LMP 05/30/2017  SpO2 100%  deferred  GENERAL APPEARANCE: healthy, alert and no distress  CV: regular rates and rhythm, normal S1 S2, no murmur noted  ABDOMEN:  soft, nontender, no HSM or masses and bowel sounds normal  BACK: No CVA tenderness  SKIN: no suspicious  lesions or rashes    Results for orders placed or performed in visit on 06/26/17   WET PREP   Result Value Ref Range    Specimen Description Vagina     Wet Prep (A)      No Trichomonas seen  No clue cells seen  Yeast seen      Micro Report Status FINAL 06/26/2017      STD pending    ASSESSMENT/PLAN:    ICD-10-CM    1. History of chlamydia infection Z86.19 NEISSERIA GONORRHOEA PCR     CHLAMYDIA TRACHOMATIS PCR     WET PREP   2. Screen for STD (sexually transmitted disease) Z11.3 HIV Antigen Antibody Combo     Hepatitis C antibody     Hepatitis B surface antigen     Hepatitis B Surface Antibody   3. Candidiasis of vagina B37.3 fluconazole (DIFLUCAN) 150 MG tablet       STD pending   Follow up with GYN if symptoms persist

## 2017-06-29 LAB
C TRACH DNA SPEC QL NAA+PROBE: NORMAL
N GONORRHOEA DNA SPEC QL NAA+PROBE: NORMAL
SPECIMEN SOURCE: NORMAL
SPECIMEN SOURCE: NORMAL

## 2017-07-01 ENCOUNTER — TELEPHONE (OUTPATIENT)
Dept: URGENT CARE | Facility: URGENT CARE | Age: 22
End: 2017-07-01

## 2017-07-09 ENCOUNTER — OFFICE VISIT (OUTPATIENT)
Dept: URGENT CARE | Facility: URGENT CARE | Age: 22
End: 2017-07-09
Payer: COMMERCIAL

## 2017-07-09 VITALS
DIASTOLIC BLOOD PRESSURE: 66 MMHG | WEIGHT: 111 LBS | HEART RATE: 79 BPM | SYSTOLIC BLOOD PRESSURE: 104 MMHG | TEMPERATURE: 97 F

## 2017-07-09 DIAGNOSIS — N89.8 VAGINAL DISCHARGE: Primary | ICD-10-CM

## 2017-07-09 LAB
MICRO REPORT STATUS: NORMAL
SPECIMEN SOURCE: NORMAL
WET PREP SPEC: NORMAL

## 2017-07-09 PROCEDURE — 99213 OFFICE O/P EST LOW 20 MIN: CPT | Performed by: FAMILY MEDICINE

## 2017-07-09 PROCEDURE — 87210 SMEAR WET MOUNT SALINE/INK: CPT | Performed by: FAMILY MEDICINE

## 2017-07-09 NOTE — NURSING NOTE
Chief Complaint   Patient presents with     Vaginal Discharge     white vaginal discharge for a few days.       Initial /66  Pulse 79  Temp 97  F (36.1  C) (Oral)  Wt 111 lb (50.3 kg) There is no height or weight on file to calculate BMI.  Medication Reconciliation: complete

## 2017-07-09 NOTE — MR AVS SNAPSHOT
"              After Visit Summary   2017    Josselyn Latif    MRN: [id number]           Patient Information     Date Of Birth          1995        Visit Information        Provider Department      2017 2:00 PM Blessing Ricketts MD [facility]        Today's Diagnoses     Vaginal discharge    -  1       Follow-ups after your visit        Who to contact     If you have questions or need follow up information about today's clinic visit or your schedule please contact [facility] directly at [phone].  Normal or non-critical lab and imaging results will be communicated to you by [employer]DI Basslinehart, letter or phone within 4 business days after the clinic has received the results. If you do not hear from us within 7 days, please contact the clinic through [employer]hart or phone. If you have a critical or abnormal lab result, we will notify you by phone as soon as possible.  Submit refill requests through [employer] or call your pharmacy and they will forward the refill request to us. Please allow 3 business days for your refill to be completed.          Additional Information About Your Visit        MyChart Information     [employer] lets you send messages to your doctor, view your test results, renew your prescriptions, schedule appointments and more. To sign up, go to [email or web address] . Click on \"Log in\" on the left side of the screen, which will take you to the Welcome page. Then click on \"Sign up Now\" on the right side of the page.     You will be asked to enter the access code listed below, as well as some personal information. Please follow the directions to create your username and password.     Your access code is: [email or web address]  Expires: 2017 10:18 AM     Your access code will  in 90 days. If you need help or a new code, please call your [place] clinic or [phone].        Care EveryWhere ID     This is your Care EveryWhere ID. This could be " used by other organizations to access your [place] medical records  [id number]        Your Vitals Were     Pulse Temperature                79 97  F (36.1  C) (Oral)           Blood Pressure from Last 3 Encounters:   07/09/17 104/66   06/26/17 104/68   06/01/17 105/77    Weight from Last 3 Encounters:   07/09/17 111 lb (50.3 kg)   06/26/17 112 lb 8 oz (51 kg)   06/01/17 110 lb (49.9 kg)              We Performed the Following     Wet prep        Primary Care Provider    None Specified       No primary provider on file.        Equal Access to Services     [facility]: Hadii rosanna [place], fam kaur, gardenia garyaldinesh aden, ramón merritt . So [facility] [phone].    ATENCIÓN: Si habla español, tiene a darden disposición servicios gratuitos de asistencia lingüística. Llame al [phone].    We comply with applicable federal civil rights laws and [place] laws. We do not discriminate on the basis of race, color, national origin, age, disability sex, sexual orientation or gender identity.            Thank you!     Thank you for choosing [facility]  for your care. Our goal is always to provide you with excellent care. Hearing back from our patients is one way we can continue to improve our services. Please take a few minutes to complete the written survey that you may receive in the mail after your visit with us. Thank you!             Your Updated Medication List - Protect others around you: Learn how to safely use, store and throw away your medicines at www.disposemymeds.org.          This list is accurate as of: 7/9/17 11:59 PM.  Always use your most recent med list.                   Brand Name Dispense Instructions for use Diagnosis    drospirenone-ethinyl estradiol 3-0.02 MG per tablet    YOMAIRA          ibuprofen 800 MG tablet    ADVIL/MOTRIN    60 tablet    Take 1 tablet (800 mg) by mouth every 8 hours as needed for moderate pain    Throat  pain

## 2017-07-09 NOTE — PROGRESS NOTES
SUBJECTIVE:                                                    Josselyn Latif is a 21 year old female who presents to clinic today for the following health issues:      Vaginal Symptoms      Duration: ongoing, treated on 6/26/17 for vaginal candidiasis    Description  vaginal discharge - white, no odor, no burning or itching, wet prep today is negative for yeast, bv and trichomonas   patien concerned due to volume of discharge, crusting in underwear since treatment for vaginal candidiasis 6/26/17, h/o chlamydia 6/4/17 and treated, negative for chlamydia and GC 6/26/17, neg for hiv/hepc,hepb   no unprotected intercourse with partner since that time, used condom, no semen/ejaculat in vaginal area to account for discharge, using ocp continuous dosing for 3 months, last menses/withdrawal bleed 1 month ago, llight flow,on ocp for at least a year, menses light, no pain, no h/o heavy menses/cramping prior to ocp,     Intensity:  moderate    Accompanying signs and symptoms (fever/dysuria/abdominal or back pain): None    History  Sexually active: yes, multiple partners, contraception - oral contraceptives (combined),   Possibility of pregnancy: No  Recent antibiotic use: no     Precipitating or alleviating factors: None    Therapies tried and outcome: Diflucan  6/26/17 for vaginal candidiasis, continue vaginal discharge, no itching      Problem list and histories reviewed & adjusted, as indicated.  Additional history: as documented    There is no problem list on file for this patient.    No past surgical history on file.    Social History   Substance Use Topics     Smoking status: Former Smoker     Smokeless tobacco: Never Used     Alcohol use Not on file     No family history on file.      Current Outpatient Prescriptions   Medication Sig Dispense Refill     drospirenone-ethinyl estradiol (YOMAIRA) 3-0.02 MG per tablet        ibuprofen (ADVIL,MOTRIN) 800 MG tablet Take 1 tablet (800 mg) by mouth every 8 hours as needed for  moderate pain (Patient not taking: Reported on 3/28/2017) 60 tablet 0     Allergies   Allergen Reactions     Nkda [No Known Drug Allergies]      No lab results found.   BP Readings from Last 3 Encounters:   07/09/17 104/66   06/26/17 104/68   06/01/17 105/77    Wt Readings from Last 3 Encounters:   07/09/17 111 lb (50.3 kg)   06/26/17 112 lb 8 oz (51 kg)   06/01/17 110 lb (49.9 kg)                  Labs reviewed in EPIC    Reviewed and updated as needed this visit by clinical staff  Tobacco  Allergies  Meds       Reviewed and updated as needed this visit by Provider         ROS:  Constitutional, HEENT, cardiovascular, pulmonary, gi and gu systems are negative, except as otherwise noted.    OBJECTIVE:     /66  Pulse 79  Temp 97  F (36.1  C) (Oral)  Wt 111 lb (50.3 kg)  There is no height or weight on file to calculate BMI.   GENERAL: healthy, alert and no distress  Decline vaginal exam from this provider since wet prep was negative    Diagnostic Test Results:  Results for orders placed or performed in visit on 07/09/17   Wet prep   Result Value Ref Range    Specimen Description Vagina     Wet Prep       No Trichomonas seen  No clue cells seen  No yeast seen      Micro Report Status FINAL 07/09/2017        ASSESSMENT/PLAN:     Problem List Items Addressed This Visit     None      Visit Diagnoses     Vaginal discharge    -  Primary    Relevant Orders    Wet prep (Completed)         ASSESSMENT/PLAN:      ICD-10-CM    1. Vaginal discharge N89.8 Wet prep     Reviewed results of wet prep which were negative, last treated for chlamydia on 6/26/17, no intercourse per patient since that time.  Many questions regarding current vaginal discharge, declined repeat testing for chlamydia since just completed treatment, partner treated, no recent intercourse since 6/26/17 diagnosis, on ocp continuous for 3 months, normal menses as scheduled a month ago, on this regiment for at least a year, reassured most likely normal  vaginal discharge, declined pelvic exam, will have test of cure with regular gyn provider     20 minute appointment with 100% spent reviewing results and discussing current concerns of vaginal discharge with neg wet prep as noted above.     MD Blessing Torres MD  [place] [facility]

## 2017-08-21 ENCOUNTER — OFFICE VISIT (OUTPATIENT)
Dept: URGENT CARE | Facility: URGENT CARE | Age: 22
End: 2017-08-21
Payer: COMMERCIAL

## 2017-08-21 VITALS
TEMPERATURE: 98.8 F | HEART RATE: 81 BPM | SYSTOLIC BLOOD PRESSURE: 107 MMHG | DIASTOLIC BLOOD PRESSURE: 70 MMHG | WEIGHT: 121 LBS

## 2017-08-21 DIAGNOSIS — N94.10 DYSPAREUNIA, FEMALE: ICD-10-CM

## 2017-08-21 DIAGNOSIS — N89.8 VAGINAL DISCHARGE: Primary | ICD-10-CM

## 2017-08-21 DIAGNOSIS — N94.9 CMT (CERVICAL MOTION TENDERNESS): ICD-10-CM

## 2017-08-21 LAB
SPECIMEN SOURCE: NORMAL
WET PREP SPEC: NORMAL

## 2017-08-21 PROCEDURE — 99214 OFFICE O/P EST MOD 30 MIN: CPT | Performed by: FAMILY MEDICINE

## 2017-08-21 PROCEDURE — 87210 SMEAR WET MOUNT SALINE/INK: CPT | Performed by: FAMILY MEDICINE

## 2017-08-21 PROCEDURE — 87591 N.GONORRHOEAE DNA AMP PROB: CPT | Performed by: FAMILY MEDICINE

## 2017-08-21 PROCEDURE — G0124 SCREEN C/V THIN LAYER BY MD: HCPCS | Performed by: FAMILY MEDICINE

## 2017-08-21 PROCEDURE — G0145 SCR C/V CYTO,THINLAYER,RESCR: HCPCS | Performed by: FAMILY MEDICINE

## 2017-08-21 PROCEDURE — 87491 CHLMYD TRACH DNA AMP PROBE: CPT | Performed by: FAMILY MEDICINE

## 2017-08-21 RX ORDER — AZITHROMYCIN 500 MG/1
1000 TABLET, FILM COATED ORAL ONCE
Qty: 2 TABLET | Refills: 0 | Status: SHIPPED | OUTPATIENT
Start: 2017-08-21 | End: 2017-08-21

## 2017-08-21 NOTE — MR AVS SNAPSHOT
"              After Visit Summary   2017    Josselyn Latif    MRN: [id number]           Patient Information     Date Of Birth          1995        Visit Information        Provider Department      2017 7:40 PM Tiny Willis [facility]        Today's Diagnoses     Vaginal discharge    -  1    Dyspareunia, female        CMT (cervical motion tenderness)           Follow-ups after your visit        Who to contact     If you have questions or need follow up information about today's clinic visit or your schedule please contact [facility] directly at [phone].  Normal or non-critical lab and imaging results will be communicated to you by MyChart, letter or phone within 4 business days after the clinic has received the results. If you do not hear from us within 7 days, please contact the clinic through PreViserhart or phone. If you have a critical or abnormal lab result, we will notify you by phone as soon as possible.  Submit refill requests through T[employer]uru or call your pharmacy and they will forward the refill request to us. Please allow 3 business days for your refill to be completed.          Additional Information About Your Visit        MyChart Information     [employer] lets you send messages to your doctor, view your test results, renew your prescriptions, schedule appointments and more. To sign up, go to [email or web address] . Click on \"Log in\" on the left side of the screen, which will take you to the Welcome page. Then click on \"Sign up Now\" on the right side of the page.     You will be asked to enter the access code listed below, as well as some personal information. Please follow the directions to create your username and password.     Your access code is: [email or web address]  Expires: 2017 10:18 AM     Your access code will  in 90 days. If you need help or a new code, please call your [place] clinic or [phone].        Care " EveryWhere ID     This is your Care EveryWhere ID. This could be used by other organizations to access your [place] medical records  [id number]        Your Vitals Were     Pulse Temperature Last Period Breastfeeding?          81 98.8  F (37.1  C) (Oral) 08/04/2017 No         Blood Pressure from Last 3 Encounters:   08/21/17 107/70   07/09/17 104/66   06/26/17 104/68    Weight from Last 3 Encounters:   08/21/17 121 lb (54.9 kg)   07/09/17 111 lb (50.3 kg)   06/26/17 112 lb 8 oz (51 kg)              We Performed the Following     CHLAMYDIA TRACHOMATIS PCR     NEISSERIA GONORRHOEA PCR     Pap imaged thin layer screen only - recommended age 21 - 24 years     Wet prep          Today's Medication Changes          These changes are accurate as of: 8/21/17  8:35 PM.  If you have any questions, ask your nurse or doctor.               Start taking these medicines.        Dose/Directions    azithromycin 500 MG tablet   Commonly known as:  ZITHROMAX   Used for:  CMT (cervical motion tenderness), Dyspareunia, female   Started by:  Tiny Willis        Dose:  1000 mg   Take 2 tablets (1,000 mg) by mouth once for 1 dose   Quantity:  2 tablet   Refills:  0            Where to get your medicines      These medications were sent to [facility] [address] [address]     Phone:  [phone]     azithromycin 500 MG tablet                Primary Care Provider    None Specified       No primary provider on file.        Equal Access to Services     DANIELA VOGEL AH: Kota [place], fam kaur, qaramón rivas. So [facility] [phone].    ATENCIÓN: Si habla español, tiene a darden disposición servicios gratuitos de asistencia lingüística. Llame al [phone].    We comply with applicable federal civil rights laws and [place] laws. We do not discriminate on the basis of race, color, national origin,  age, disability sex, sexual orientation or gender identity.            Thank you!     Thank you for choosing [place] [facility]  for your care. Our goal is always to provide you with excellent care. Hearing back from our patients is one way we can continue to improve our services. Please take a few minutes to complete the written survey that you may receive in the mail after your visit with us. Thank you!             Your Updated Medication List - Protect others around you: Learn how to safely use, store and throw away your medicines at www.disposemymeds.org.          This list is accurate as of: 8/21/17  8:35 PM.  Always use your most recent med list.                   Brand Name Dispense Instructions for use Diagnosis    azithromycin 500 MG tablet    ZITHROMAX    2 tablet    Take 2 tablets (1,000 mg) by mouth once for 1 dose    CMT (cervical motion tenderness), Dyspareunia, female       drospirenone-ethinyl estradiol 3-0.02 MG per tablet    YOMAIRA          ibuprofen 800 MG tablet    ADVIL/MOTRIN    60 tablet    Take 1 tablet (800 mg) by mouth every 8 hours as needed for moderate pain    Throat pain

## 2017-08-22 NOTE — PROGRESS NOTES
Couple days of discharge  Has been occurring on and off for a while  Has had 1-2 yeast infections this year  Thinks this maybe yeast infection  Has had BV in the past too   Last intercourse 3 days ago, condom use  Treated for chlamydia in 6/2017  No douching  No fever  No abdominal pain  Does report pelvic pain and dyspareunia  No urinary issues  LMP: 3 weeks ago, regular    Allergies   Allergen Reactions     Nkda [No Known Drug Allergies]        No past medical history on file.      Current Outpatient Prescriptions on File Prior to Visit:  drospirenone-ethinyl estradiol (YOMAIRA) 3-0.02 MG per tablet    ibuprofen (ADVIL,MOTRIN) 800 MG tablet Take 1 tablet (800 mg) by mouth every 8 hours as needed for moderate pain (Patient not taking: Reported on 3/28/2017)     No current facility-administered medications on file prior to visit.     Social History   Substance Use Topics     Smoking status: Former Smoker     Smokeless tobacco: Never Used     Alcohol use Not on file       ROS:  General: negative for fever  CV: negative for chest pain  ABD: Negative for abd pain.  : positive discharge, pelvic pain  Neurologic:negative for headache    OBJECTIVE:  /70  Pulse 81  Temp 98.8  F (37.1  C) (Oral)  Wt 121 lb (54.9 kg)  LMP 08/04/2017  Breastfeeding? No   General:   awake, alert, and cooperative.  NAD.   Head: Normocephalic, atraumatic.  Eyes: Conjunctiva clear,   Heart: Regular rate and rhythm. No murmur.  Lungs: Chest is clear; no wheezes or rales.   Abd: soft, non tender, no rebound or guarding  : CMT present. White, clumpy, non odorous discharge present. No external lesions appreciated. Cervix friable with a erythematous appearance surrounding the os.   Neuro: Alert and oriented - normal speech.    ASSESSMENT:    ICD-10-CM    1. Vaginal discharge N89.8 Wet prep     NEISSERIA GONORRHOEA PCR     CHLAMYDIA TRACHOMATIS PCR     Pap imaged thin layer screen only - recommended age 21 - 24 years   2. Dyspareunia, female  N94.10 NEISSERIA GONORRHOEA PCR     CHLAMYDIA TRACHOMATIS PCR     Pap imaged thin layer screen only - recommended age 21 - 24 years     azithromycin (ZITHROMAX) 500 MG tablet   3. CMT (cervical motion tenderness) N94.9 azithromycin (ZITHROMAX) 500 MG tablet     22 year old female with vaginal discharge and dyspareunia. No fever or abdominal pain but does have moderate CMT on exam and her cervix appears erythematous, irregular and friable. Obtained wet prep which was negative, discussed results with patient. Also obtained STD testing and pap smear given she does not recall ever having one. Given her CMT and hx of chlaymdia in June will treat with 1g of azithromycin. She understands that the results of the STD testing do not come back for a few days. Pelvic rest for minimum of 1 week.     PLAN:   Follow up:  Couple weeks with OB/Gyn  Advised about symptoms which might herald more serious problems.      Dr. Willis

## 2017-08-22 NOTE — NURSING NOTE
Chief Complaint   Patient presents with     Vaginal Discharge     vaginal discharge and vaginal soreness for two days.        Initial /70  Pulse 81  Temp 98.8  F (37.1  C) (Oral)  Wt 121 lb (54.9 kg)  LMP 08/04/2017  Breastfeeding? No There is no height or weight on file to calculate BMI.  Medication Reconciliation: complete

## 2017-08-23 LAB
C TRACH DNA SPEC QL NAA+PROBE: NEGATIVE
N GONORRHOEA DNA SPEC QL NAA+PROBE: NEGATIVE
SPECIMEN SOURCE: NORMAL
SPECIMEN SOURCE: NORMAL

## 2017-08-25 LAB
COPATH REPORT: ABNORMAL
PAP: ABNORMAL

## 2017-08-30 DIAGNOSIS — R87.612 LOW GRADE SQUAMOUS INTRAEPITHELIAL LESION ON CYTOLOGIC SMEAR OF CERVIX (LGSIL): Primary | ICD-10-CM

## 2017-08-30 NOTE — PROGRESS NOTES
Called patient to discuss abnormal pap results and referral to OB/Gyn.  Discussed the LSIL on pap smear and potential colp vs surveilance for 1 year.   Answered questions regarding the results.   Will send a letter with results as well.     Dr. Willis

## 2017-08-30 NOTE — LETTER
[place] [facility]  [address] [id number] [phone]          August 30, 2017    RE:  Mukund Latif                                                                                                                                                       [id number]   [facility] [place] [address]            Please see the attached lab results from your urgent care appointment. We discussed the abnormal pap smear and follow up with OB/Gyn on the phone. The pap smear shows low grade changes in the cells of the cervix and this needs to be monitored going forward. Please let us know if you have any additional questions.     Results for orders placed or performed in visit on 08/21/17   Pap imaged thin layer screen only - recommended age 21 - 24 years   Result Value Ref Range    PAP LSIL (A)     Copath Report         Patient Name: MUKUND LATIF  MR#: [id number]  Specimen #: [id number]  Collected: 8/21/2017  Received: 8/23/2017  Reported: 8/25/2017 15:34  Ordering Phy(s): ELIZABETH VALDEZ    For improved result formatting, select 'View Enhanced Report Format'  under Linked Documents section.    SPECIMEN/STAIN PROCESS:  Pap imaged thin layer prep screening (Surepath, FocalPoint with guided  screening)       Pap-Cyto x 1    SOURCE: Cervical, endocervical  ----------------------------------------------------------------   Pap imaged thin layer prep screening (Surepath, FocalPoint with guided  screening)  SPECIMEN ADEQUACY:  Satisfactory for evaluation.  -Transformation zone component present.    CYTOLOGIC INTERPRETATION:    Epithelial cell abnormality:  squamous cell:  low-grade squamous  intraepithelial lesion (LSIL)         Organism(s):  -Fungal organisms morphologically consistent with Candida spp.    Electronically signed out by:    Eze Snow M.D.    Processed and screened at [facility],  [facility]  HISTORY:  LMP: 8/4/2017  Exam Note:: Friable, abnormal appearance of cervix,    Papanicolaou Test Limitations:  Cervical cytology is a screening test  with limited sensitivity; regular screening is critical for cancer  prevention; Pap tests are primarily effective for the  diagnosis/prevention of squamous cell carcinoma, not adenocarcinomas or  other cancers.    TESTING LAB LOCATION:  [address]  [address] [phone]    COLLECTION SITE:  Client:  [facility]  Location: BLUC (S)     Wet prep   Result Value Ref Range    Specimen Description Vagina     Wet Prep No Trichomonas seen     Wet Prep No clue cells seen     Wet Prep No yeast seen    NEISSERIA GONORRHOEA PCR   Result Value Ref Range    Specimen Descrip Cervix     N Gonorrhea PCR Negative NEG^Negative   CHLAMYDIA TRACHOMATIS PCR   Result Value Ref Range    Specimen Description Cervix     Chlamydia Trachomatis PCR Negative NEG^Negative         Sincerely,        Tiny Willis DO

## 2018-11-28 ENCOUNTER — OFFICE VISIT (OUTPATIENT)
Dept: URGENT CARE | Facility: URGENT CARE | Age: 23
End: 2018-11-28
Payer: COMMERCIAL

## 2018-11-28 VITALS
DIASTOLIC BLOOD PRESSURE: 74 MMHG | OXYGEN SATURATION: 98 % | HEART RATE: 83 BPM | WEIGHT: 119 LBS | SYSTOLIC BLOOD PRESSURE: 122 MMHG

## 2018-11-28 DIAGNOSIS — R82.90 ABNORMAL URINE ODOR: ICD-10-CM

## 2018-11-28 DIAGNOSIS — N89.8 VAGINAL DISCHARGE: Primary | ICD-10-CM

## 2018-11-28 LAB
ALBUMIN UR-MCNC: NEGATIVE MG/DL
APPEARANCE UR: CLEAR
BILIRUB UR QL STRIP: NEGATIVE
COLOR UR AUTO: YELLOW
GLUCOSE UR STRIP-MCNC: NEGATIVE MG/DL
HGB UR QL STRIP: NEGATIVE
KETONES UR STRIP-MCNC: NEGATIVE MG/DL
LEUKOCYTE ESTERASE UR QL STRIP: NEGATIVE
NITRATE UR QL: NEGATIVE
PH UR STRIP: 5.5 PH (ref 5–7)
RBC #/AREA URNS AUTO: NORMAL /HPF
SOURCE: NORMAL
SP GR UR STRIP: 1.02 (ref 1–1.03)
SPECIMEN SOURCE: NORMAL
UROBILINOGEN UR STRIP-ACNC: 0.2 EU/DL (ref 0.2–1)
WBC #/AREA URNS AUTO: NORMAL /HPF
WET PREP SPEC: NORMAL

## 2018-11-28 PROCEDURE — 87210 SMEAR WET MOUNT SALINE/INK: CPT | Performed by: PHYSICIAN ASSISTANT

## 2018-11-28 PROCEDURE — 81001 URINALYSIS AUTO W/SCOPE: CPT | Performed by: PHYSICIAN ASSISTANT

## 2018-11-28 PROCEDURE — 87591 N.GONORRHOEAE DNA AMP PROB: CPT | Performed by: PHYSICIAN ASSISTANT

## 2018-11-28 PROCEDURE — 99213 OFFICE O/P EST LOW 20 MIN: CPT | Performed by: PHYSICIAN ASSISTANT

## 2018-11-28 PROCEDURE — 87491 CHLMYD TRACH DNA AMP PROBE: CPT | Performed by: PHYSICIAN ASSISTANT

## 2018-11-28 NOTE — MR AVS SNAPSHOT
"              After Visit Summary   11/28/2018    Josselyn Latif    MRN: [id number]           Patient Information     Date Of Birth          1995        Visit Information        Provider Department      11/28/2018 6:05 PM Sho Cesar PA-C [facility]        Today's Diagnoses     Vaginal discharge    -  1    Abnormal urine odor          Care Instructions      (N89.8) Vaginal discharge  (primary encounter diagnosis)  Comment: consistent with normal discharge  Plan: Wet prep, UA with Microscopic reflex to         Culture, NEISSERIA GONORRHOEA PCR, CHLAMYDIA         TRACHOMATIS PCR, CANCELED: Routine UA with         micro reflex to culture            (R82.90) Abnormal urine odor  Comment:   Plan: UA clear    Follow up with primary clinic.            Follow-ups after your visit        Who to contact     If you have questions or need follow up information about today's clinic visit or your schedule please contact [facility] directly at [phone].  Normal or non-critical lab and imaging results will be communicated to you by Cortexahart, letter or phone within 4 business days after the clinic has received the results. If you do not hear from us within 7 days, please contact the clinic through Cortexahart or phone. If you have a critical or abnormal lab result, we will notify you by phone as soon as possible.  Submit refill requests through [employer] or call your pharmacy and they will forward the refill request to us. Please allow 3 business days for your refill to be completed.          Additional Information About Your Visit        CortexaharSimbol Materials Information     [employer] lets you send messages to your doctor, view your test results, renew your prescriptions, schedule appointments and more. To sign up, go to [email or web address] . Click on \"Log in\" on the left side of the screen, which will take you to the Welcome page. Then click on \"Sign up Now\" on the " right side of the page.     You will be asked to enter the access code listed below, as well as some personal information. Please follow the directions to create your username and password.     Your access code is: [email or web address]  Expires: 2019  7:18 PM     Your access code will  in 90 days. If you need help or a new code, please call your [place] clinic or [phone].        Care EveryWhere ID     This is your Care EveryWhere ID. This could be used by other organizations to access your [place] medical records  [id number]        Your Vitals Were     Pulse Last Period Pulse Oximetry Breastfeeding?          83 2018 98% No         Blood Pressure from Last 3 Encounters:   18 122/74   17 107/70   17 104/66    Weight from Last 3 Encounters:   18 119 lb (54 kg)   17 121 lb (54.9 kg)   17 111 lb (50.3 kg)              We Performed the Following     CHLAMYDIA TRACHOMATIS PCR     NEISSERIA GONORRHOEA PCR     UA with Microscopic reflex to Culture     Wet prep        Primary Care Provider Fax #    Physician No Ref-Primary [phone]       No address on file        Equal Access to Services     DANIELA VOGEL : Hadii rosanna Houston, waaxda luchaimadaha, qaybta kaalmada adefuadda, ramón fields. So [facility] [phone].    ATENCIÓN: Si habla español, tiene a darden disposición servicios gratuitos de asistencia lingüística. Darwin al [phone].    We comply with applicable federal civil rights laws and [place] laws. We do not discriminate on the basis of race, color, national origin, age, disability, sex, sexual orientation, or gender identity.            Thank you!     Thank you for choosing [facility]  for your care. Our goal is always to provide you with excellent care. Hearing back from our patients is one way we can continue to improve our services. Please take a few minutes to complete the written survey that you  may receive in the mail after your visit with us. Thank you!             Your Updated Medication List - Protect others around you: Learn how to safely use, store and throw away your medicines at www.disposemymeds.org.          This list is accurate as of 11/28/18  7:18 PM.  Always use your most recent med list.                   Brand Name Dispense Instructions for use Diagnosis    drospirenone-ethinyl estradiol 3-0.02 MG per tablet    YOMAIRA

## 2018-11-29 NOTE — PROGRESS NOTES
SUBJECTIVE:  Josselyn Latif is a 23 year old female who presents with vaginal discharge and slight itching.  .    Onset of symptoms 1 week(s) ago, stable since.     Pain:none.     Vaginal bleeding: No      Vaginal symptoms: mild itching, no sores  No LMP recorded.  11/7/18    Sexually active: yes, oral contraceptives and condoms.    Accidentally took more than one OC a day a couple of times last month    History reviewed. No pertinent past medical history.  Current Outpatient Prescriptions   Medication Sig Dispense Refill     drospirenone-ethinyl estradiol (YOMAIRA) 3-0.02 MG per tablet        Social History     Social History     Marital status: Single     Spouse name: N/A     Number of children: N/A     Years of education: N/A     Occupational History     Not on file.     Social History Main Topics     Smoking status: Former Smoker     Smokeless tobacco: Never Used     Alcohol use 0.0 oz/week     0 Standard drinks or equivalent per week     Drug use: No     Sexual activity: Yes     Other Topics Concern     Not on file     Social History Narrative       ROS:   CONSTITUTIONAL:NEGATIVE for fever, chills, change in weight  INTEGUMENTARY/SKIN: NEGATIVE for worrisome rashes, moles or lesions  : as per HPI  MUSCULOSKELETAL: NEGATIVE for significant arthralgias or myalgia  Review of systems negative except as stated above.    OBJECTIVE:  /74  Pulse 83  Wt 119 lb (54 kg)  SpO2 98%  Breastfeeding? No  : deferred, self wet prep done  GENERAL APPEARANCE: healthy, alert and no distress  ABDOMEN:  soft, nontender, no HSM or masses and bowel sounds normal  BACK: No CVA tenderness  SKIN: no suspicious lesions or rashes    (N89.8) Vaginal discharge  (primary encounter diagnosis)  Comment:   Plan: Wet prep, UA with Microscopic reflex to         Culture, NEISSERIA GONORRHOEA PCR, CHLAMYDIA         TRACHOMATIS PCR, CANCELED: Routine UA with         micro reflex to culture            (R82.90) Abnormal urine odor  Comment:    Plan: UA clear    Follow up with primary clinic.

## 2018-11-29 NOTE — PATIENT INSTRUCTIONS
(N89.8) Vaginal discharge  (primary encounter diagnosis)  Comment: consistent with normal discharge  Plan: Wet prep, UA with Microscopic reflex to         Culture, NEISSERIA GONORRHOEA PCR, CHLAMYDIA         TRACHOMATIS PCR, CANCELED: Routine UA with         micro reflex to culture            (R82.90) Abnormal urine odor  Comment:   Plan: UA clear    Follow up with primary clinic.

## 2019-05-29 ENCOUNTER — OFFICE VISIT (OUTPATIENT)
Dept: URGENT CARE | Facility: URGENT CARE | Age: 24
End: 2019-05-29
Payer: COMMERCIAL

## 2019-05-29 VITALS
WEIGHT: 116.5 LBS | DIASTOLIC BLOOD PRESSURE: 73 MMHG | TEMPERATURE: 98.4 F | OXYGEN SATURATION: 99 % | SYSTOLIC BLOOD PRESSURE: 109 MMHG | HEART RATE: 81 BPM

## 2019-05-29 DIAGNOSIS — J02.9 PHARYNGITIS, UNSPECIFIED ETIOLOGY: Primary | ICD-10-CM

## 2019-05-29 DIAGNOSIS — R05.9 COUGH: ICD-10-CM

## 2019-05-29 LAB
DEPRECATED S PYO AG THROAT QL EIA: NORMAL
SPECIMEN SOURCE: NORMAL

## 2019-05-29 PROCEDURE — 87081 CULTURE SCREEN ONLY: CPT | Performed by: PHYSICIAN ASSISTANT

## 2019-05-29 PROCEDURE — 87880 STREP A ASSAY W/OPTIC: CPT | Performed by: PHYSICIAN ASSISTANT

## 2019-05-29 PROCEDURE — 99214 OFFICE O/P EST MOD 30 MIN: CPT | Performed by: PHYSICIAN ASSISTANT

## 2019-05-29 RX ORDER — AZITHROMYCIN 250 MG/1
TABLET, FILM COATED ORAL
Qty: 6 TABLET | Refills: 0 | Status: SHIPPED | OUTPATIENT
Start: 2019-05-29 | End: 2020-03-10

## 2019-05-29 RX ORDER — ALBUTEROL SULFATE 90 UG/1
2 AEROSOL, METERED RESPIRATORY (INHALATION) EVERY 6 HOURS
Qty: 6.7 G | Refills: 0 | Status: SHIPPED | OUTPATIENT
Start: 2019-05-29 | End: 2024-07-23

## 2019-05-29 NOTE — LETTER
[place] [facility]  [address] [address]  [phone]      May 29, 2019    RE:  Josselyn Latif                                                                                                                                                       [address]   [facility] [address]            To whom it may concern:    Josselyn Latif is under my professional care for    Pharyngitis, unspecified etiology  Cough.   She  may return to work with the following: No restrictions on or about 05/31/2019.          Sincerely,        Kenn Camacho    [place] [facility]

## 2019-05-30 LAB
BACTERIA SPEC CULT: NORMAL
SPECIMEN SOURCE: NORMAL

## 2019-05-30 NOTE — PROGRESS NOTES
SUBJECTIVE:  Josselyn Latif is a 23 year old female who presents to the clinic today with a chief complaint of cough sore throat runny nose and congestion for 5 day(s).  Her cough is described as persistent.    The patient's symptoms are moderate and not changing over the course of time.  Associated symptoms include congestion, nasal congestion, rhinorrhea, sore throat and cough. The patient's symptoms are exacerbated by no particular triggers  Patient has been using nothing  to improve symptoms.    She recently had travel to [place] in [place] and now returned to [place].    No past medical history on file.    Current Outpatient Medications   Medication Sig Dispense Refill     drospirenone-ethinyl estradiol (YOMAIRA) 3-0.02 MG per tablet          Social History     Tobacco Use     Smoking status: Former Smoker     Smokeless tobacco: Never Used   Substance Use Topics     Alcohol use: Yes     Alcohol/week: 0.0 oz       ROS  Review of systems negative except as stated above.    OBJECTIVE:  /73   Pulse 81   Temp 98.4  F (36.9  C) (Oral)   Wt 52.8 kg (116 lb 8 oz)   SpO2 99%   GENERAL APPEARANCE: healthy, alert and no distress  EYES: EOMI,  PERRL, conjunctiva clear  HENT: ear canals and TM's normal.  Nose and mouth without ulcers, erythema or lesions  NECK: supple, nontender, no lymphadenopathy  RESP: lungs with slight wheezes in the mid to lower lung fields to auscultation - no rales, rhonchi or wheezes  CV: regular rates and rhythm, normal S1 S2, no murmur noted  ABDOMEN:  soft, nontender, no HSM or masses and bowel sounds normal  NEURO: Normal strength and tone, sensory exam grossly normal,  normal speech and mentation  SKIN: no suspicious lesions or rashes      Lab:  Results for orders placed or performed in visit on 05/29/19   Strep, Rapid Screen   Result Value Ref Range    Specimen Description Throat     Rapid Strep A Screen       NEGATIVE: No Group A streptococcal antigen detected by immunoassay,  await culture report.       ASSESSMENT:      Acute Bronchitis    PLAN:    ASSESSMENT:  1. Pharyngitis, unspecified etiology    2. Cough        PLAN:  Orders Placed This Encounter     azithromycin (ZITHROMAX Z-KELSI) 250 MG tablet     albuterol (PROAIR HFA/PROVENTIL HFA/VENTOLIN HFA) 108 (90 Base) MCG/ACT inhaler       Patient Instructions   Sug[place] increased rest, increased fluids and bedside humidification for comfort.  OTC tylenol and Ibuprofen for analgesia and antipyretic.  Dosed by packaging directions and weight .  Antibiotic as written below.   Noncontagious after 24 hours on the antibiotic.    Follow up with Primary Care Provider if any complications or sequelae present.      Patient Education     Bronchitis, Antibiotic Treatment (Adult)    Bronchitis is an infection of the air passages (bronchial tubes) in your lungs. It often occurs when you have a cold. This illness is contagious during the first few days and is spread through the air by coughing and sneezing, or by direct contact (touching the sick person and then touching your own eyes, nose, or mouth).  Symptoms of bronchitis include cough with mucus (phlegm) and low-grade fever. Bronchitis usually lasts 7 to 14 days. Mild cases can be treated with simple home remedies. More severe infection is treated with an antibiotic.  Home care  Follow these guidelines when caring for yourself at home:    If your symptoms are severe, rest at home for the first 2 to 3 days. When you go back to your usual activities, don't let yourself get too tired.    Don't smoke. Also stay away from secondhand smoke.    You may use over-the-counter medicines to control fever or pain, unless another medicine was prescribed. If you have chronic liver or kidney disease or have ever had a stomach ulcer or gastrointestinal bleeding, talk with your healthcare provider before using these medicines. Also talk to your provider if you are taking medicine to prevent blood clots. Aspirin  should never be given to anyone younger than 18 who is ill with a viral infection or fever. It may cause severe liver or brain damage.    Your appetite may be low, so a light diet is fine. Stay well hydrated by drinking 6 to 8 glasses of fluids per day. This includes water, soft drinks, sports drinks, juices, tea, or soup. Extra fluids will help loosen mucus in your nose and lungs.    Over-the-counter cough, cold, and sore-throat medicines will not shorten the length of the illness, but they may be helpful to reduce your symptoms. Don't use decongestants if you have high blood pressure.    Finish all antibiotic medicine. Do this even if you are feeling better after only a few days.  Follow-up care  Follow up with your healthcare provider, or as advised. If you had an X-ray or ECG (electrocardiogram), a specialist will review it. You will be told of any new test results that may affect your care.  If you are age 65 or older, if you smoke, or if you have a chronic lung disease or condition that affects your immune system, ask your healthcare provider about getting a pneumococcal vaccine and a yearly flu shot (influenza vaccine).  When to seek medical advice  Call your healthcare provider right away if any of these occur:    Fever of 100.4 F (38 C) or higher, or as directed by your healthcare provider    Coughing up more sputum    Weakness, drowsiness, headache, facial pain, ear pain, or a stiff neck  Call 911  Call 911 if any of these occur.    Coughing up blood    Weakness, drowsiness, headache, or stiff neck that get worse    Trouble breathing, wheezing, or pain with breathing  Date Last Reviewed: 6/1/2018 2000-2018 [employer]. [address] [place] [id number]. All rights reserved. This information is not intended as a substitute for professional medical care. Always follow your healthcare professional's instructions.

## 2019-05-30 NOTE — PATIENT INSTRUCTIONS
Sugessted increased rest, increased fluids and bedside humidification for comfort.  OTC tylenol and Ibuprofen for analgesia and antipyretic.  Dosed by packaging directions and weight .  Antibiotic as written below.   Noncontagious after 24 hours on the antibiotic.    Follow up with Primary Care Provider if any complications or sequelae present.      Patient Education     Bronchitis, Antibiotic Treatment (Adult)    Bronchitis is an infection of the air passages (bronchial tubes) in your lungs. It often occurs when you have a cold. This illness is contagious during the first few days and is spread through the air by coughing and sneezing, or by direct contact (touching the sick person and then touching your own eyes, nose, or mouth).  Symptoms of bronchitis include cough with mucus (phlegm) and low-grade fever. Bronchitis usually lasts 7 to 14 days. Mild cases can be treated with simple home remedies. More severe infection is treated with an antibiotic.  Home care  Follow these guidelines when caring for yourself at home:    If your symptoms are severe, rest at home for the first 2 to 3 days. When you go back to your usual activities, don't let yourself get too tired.    Don't smoke. Also stay away from secondhand smoke.    You may use over-the-counter medicines to control fever or pain, unless another medicine was prescribed. If you have chronic liver or kidney disease or have ever had a stomach ulcer or gastrointestinal bleeding, talk with your healthcare provider before using these medicines. Also talk to your provider if you are taking medicine to prevent blood clots. Aspirin should never be given to anyone younger than 18 who is ill with a viral infection or fever. It may cause severe liver or brain damage.    Your appetite may be low, so a light diet is fine. Stay well hydrated by drinking 6 to 8 glasses of fluids per day. This includes water, soft drinks, sports drinks, juices, tea, or soup. Extra fluids will  help loosen mucus in your nose and lungs.    Over-the-counter cough, cold, and sore-throat medicines will not shorten the length of the illness, but they may be helpful to reduce your symptoms. Don't use decongestants if you have high blood pressure.    Finish all antibiotic medicine. Do this even if you are feeling better after only a few days.  Follow-up care  Follow up with your healthcare provider, or as advised. If you had an X-ray or ECG (electrocardiogram), a specialist will review it. You will be told of any new test results that may affect your care.  If you are age 65 or older, if you smoke, or if you have a chronic lung disease or condition that affects your immune system, ask your healthcare provider about getting a pneumococcal vaccine and a yearly flu shot (influenza vaccine).  When to seek medical advice  Call your healthcare provider right away if any of these occur:    Fever of 100.4 F (38 C) or higher, or as directed by your healthcare provider    Coughing up more sputum    Weakness, drowsiness, headache, facial pain, ear pain, or a stiff neck  Call 911  Call 911 if any of these occur.    Coughing up blood    Weakness, drowsiness, headache, or stiff neck that get worse    Trouble breathing, wheezing, or pain with breathing  Date Last Reviewed: 6/1/2018 2000-2018 [employer]. [address] [place] [address], [place] [address]. All rights reserved. This information is not intended as a substitute for professional medical care. Always follow your healthcare professional's instructions.

## 2020-03-10 ENCOUNTER — OFFICE VISIT (OUTPATIENT)
Dept: URGENT CARE | Facility: URGENT CARE | Age: 25
End: 2020-03-10
Payer: COMMERCIAL

## 2020-03-10 VITALS
OXYGEN SATURATION: 100 % | RESPIRATION RATE: 14 BRPM | SYSTOLIC BLOOD PRESSURE: 98 MMHG | TEMPERATURE: 99.4 F | HEIGHT: 58 IN | WEIGHT: 120 LBS | DIASTOLIC BLOOD PRESSURE: 64 MMHG | HEART RATE: 90 BPM | BODY MASS INDEX: 25.19 KG/M2

## 2020-03-10 DIAGNOSIS — J30.89 SEASONAL ALLERGIC RHINITIS DUE TO OTHER ALLERGIC TRIGGER: ICD-10-CM

## 2020-03-10 DIAGNOSIS — J01.90 ACUTE SINUSITIS WITH COEXISTING CONDITION REQUIRING PROPHYLACTIC TREATMENT: Primary | ICD-10-CM

## 2020-03-10 PROCEDURE — 99214 OFFICE O/P EST MOD 30 MIN: CPT | Performed by: PHYSICIAN ASSISTANT

## 2020-03-10 RX ORDER — FLUTICASONE PROPIONATE 50 MCG
2 SPRAY, SUSPENSION (ML) NASAL DAILY
Qty: 16 G | Refills: 0 | Status: SHIPPED | OUTPATIENT
Start: 2020-03-10 | End: 2024-07-23

## 2020-03-10 RX ORDER — AMOXICILLIN 875 MG
875 TABLET ORAL 2 TIMES DAILY
Qty: 20 TABLET | Refills: 0 | Status: SHIPPED | OUTPATIENT
Start: 2020-03-10 | End: 2020-08-18

## 2020-03-10 ASSESSMENT — MIFFLIN-ST. JEOR: SCORE: 1184.07

## 2020-03-10 NOTE — PATIENT INSTRUCTIONS
(J01.90) Acute sinusitis with coexisting condition requiring prophylactic treatment  (primary encounter diagnosis)  Comment:   Plan: amoxicillin (AMOXIL) 875 MG tablet            (J30.89) Seasonal allergic rhinitis due to other allergic trigger  Comment:   Plan: fluticasone (FLONASE) 50 MCG/ACT nasal spray          Saline nasal spray  Tylenol or ibuprofen as needed

## 2020-03-10 NOTE — LETTER
[place] [facility]  [address] [address]  [phone]      March 10, 2020    RE:  Josselyn Latif                                                                                                                                                       [id number]   [facility] [place] [address]            To whom it may concern:    Josselyn Latif was seen in clinic today for illness.  She may return to work on Thursday.        Sincerely,        Sho Cavazos PA-C    [place] [facility]

## 2020-03-10 NOTE — PROGRESS NOTES
"Patient presents with:  URI: x4 days-throat pain, productive cough, green sputum, sometimes fatigue, has felt hot-comes and goes    SUBJECTIVE:   Josselyn Latif is a 24 year old female presenting with a chief complaint of:  1) 5 day history of cough, throat pain and fatigue  2) sore throat.  Onset of symptoms was 4 day(s) ago.  Course of illness is worsening.    Severity moderate  Current and Associated symptoms: as above  Treatment measures tried include nyquil and yue seltzer.  Predisposing factors include none, she did have a flu shot this season.    PMH:  Spring allergies    FH  Non contributory    Social History     Tobacco Use     Smoking status: Former Smoker     Smokeless tobacco: Never Used   Substance Use Topics     Alcohol use: Yes     Alcohol/week: 0.0 standard drinks       ROS:  CONSTITUTIONAL:as per HPI  INTEGUMENTARY/SKIN: NEGATIVE for worrisome rashes, moles or lesions  EYES: NEGATIVE for vision changes or irritation  ENT/MOUTH: as per HPI  RESP:as per HPI  CV: NEGATIVE for chest pain, palpitations or peripheral edema  GI: NEGATIVE for nausea, abdominal pain, heartburn, or change in bowel habits  : negative  MUSCULOSKELETAL: NEGATIVE for significant arthralgias or myalgia  NEURO: NEGATIVE for weakness, dizziness or paresthesias  Review of systems negative except as stated above.    OBJECTIVE  :BP 98/64   Pulse 90   Temp 99.4  F (37.4  C) (Oral)   Resp 14   Ht 1.473 m (4' 10\")   Wt 54.4 kg (120 lb)   LMP  (LMP Unknown)   SpO2 100%   Breastfeeding No   BMI 25.08 kg/m    GENERAL APPEARANCE: healthy, alert and no distress  EYES: EOMI,  PERRL, conjunctiva clear  HENT: ear canals and TM's normal.  Nose and mouth without ulcers, erythema or lesions  HENT: nasal turbinates erythematous, swollen and rhinorrhea yellow  NECK: supple, nontender, no lymphadenopathy  RESP: lungs clear to auscultation - no rales, rhonchi or wheezes  CV: regular rates and rhythm, normal S1 S2, no murmur noted  ABDOMEN:  " soft, nontender, no HSM or masses and bowel sounds normal  NEURO: Normal strength and tone, sensory exam grossly normal,  normal speech and mentation  SKIN: no suspicious lesions or rashes     (J01.90) Acute sinusitis with coexisting condition requiring prophylactic treatment  (primary encounter diagnosis)  Comment:   Plan: amoxicillin (AMOXIL) 875 MG tablet            (J30.89) Seasonal allergic rhinitis due to other allergic trigger  Comment:   Plan: fluticasone (FLONASE) 50 MCG/ACT nasal spray          Saline nasal spray  Tylenol or ibuprofen as needed

## 2020-08-18 ENCOUNTER — OFFICE VISIT (OUTPATIENT)
Dept: URGENT CARE | Facility: URGENT CARE | Age: 25
End: 2020-08-18
Payer: COMMERCIAL

## 2020-08-18 VITALS
WEIGHT: 115 LBS | BODY MASS INDEX: 24.14 KG/M2 | OXYGEN SATURATION: 100 % | TEMPERATURE: 99.1 F | HEIGHT: 58 IN | RESPIRATION RATE: 16 BRPM | HEART RATE: 85 BPM

## 2020-08-18 DIAGNOSIS — R50.9 FEVER AND CHILLS: Primary | ICD-10-CM

## 2020-08-18 DIAGNOSIS — R05.9 COUGH: ICD-10-CM

## 2020-08-18 LAB
DEPRECATED S PYO AG THROAT QL EIA: NEGATIVE
SPECIMEN SOURCE: NORMAL
SPECIMEN SOURCE: NORMAL
STREP GROUP A PCR: NOT DETECTED
WBC # BLD AUTO: 3.3 10E9/L (ref 4–11)

## 2020-08-18 PROCEDURE — 99213 OFFICE O/P EST LOW 20 MIN: CPT | Performed by: FAMILY MEDICINE

## 2020-08-18 PROCEDURE — 87651 STREP A DNA AMP PROBE: CPT | Performed by: FAMILY MEDICINE

## 2020-08-18 PROCEDURE — U0003 INFECTIOUS AGENT DETECTION BY NUCLEIC ACID (DNA OR RNA); SEVERE ACUTE RESPIRATORY SYNDROME CORONAVIRUS 2 (SARS-COV-2) (CORONAVIRUS DISEASE [COVID-19]), AMPLIFIED PROBE TECHNIQUE, MAKING USE OF HIGH THROUGHPUT TECHNOLOGIES AS DESCRIBED BY CMS-2020-01-R: HCPCS | Performed by: FAMILY MEDICINE

## 2020-08-18 PROCEDURE — 85048 AUTOMATED LEUKOCYTE COUNT: CPT | Performed by: FAMILY MEDICINE

## 2020-08-18 PROCEDURE — 40001204 ZZHCL STATISTIC STREP A RAPID: Performed by: FAMILY MEDICINE

## 2020-08-18 PROCEDURE — 36415 COLL VENOUS BLD VENIPUNCTURE: CPT | Performed by: FAMILY MEDICINE

## 2020-08-18 RX ORDER — NORGESTIMATE AND ETHINYL ESTRADIOL 0.25-0.035
1 KIT ORAL DAILY
COMMUNITY
Start: 2020-08-11

## 2020-08-18 ASSESSMENT — MIFFLIN-ST. JEOR: SCORE: 1161.39

## 2020-08-18 NOTE — LETTER
[place] [facility]  [address] [address]  [phone]      August 18, 2020    RE:  Josselyn Latif                                                                                                                                                       [id number]   [facility] [place] [address]            To whom it may concern:    Josselyn Latif is under my professional care for Medical..   She  may return to work with the following: No working or lifting restrictions on or about     Stay home and away from others (self-isolate) until:    You've had no fever--and no medicine that reduces fever--for one full day (24 hours). And   Your other symptoms have gotten better. For example, your cough or breathing has improved. And   At least 10 days have passed since your symptoms started.          Sincerely,        James Shipman, DO    [place] [facility]

## 2020-08-18 NOTE — PROGRESS NOTES
"SUBJECTIVE: Josselyn Latif is a 24 year old female presenting with a chief complaint of fever, nasal congestion and cough .  Onset of symptoms was 10 day(s) ago with fever but cough and nasal congestion 2days.  Course of illness is same.    Severity moderate  Current and Associated symptoms: runny nose, stuffy nose and cough - non-productive  Treatment measures tried include Tylenol/Ibuprofen.  Predisposing factors include negative COVID test last wednesday.    No past medical history on file.  Allergies   Allergen Reactions     Molds & Smuts      Seasonal Allergies      Amoxicillin Rash     Social History     Tobacco Use     Smoking status: Former Smoker     Smokeless tobacco: Never Used   Substance Use Topics     Alcohol use: Yes     Alcohol/week: 0.0 standard drinks       ROS:  SKIN: no rash  GI: no vomiting    OBJECTIVE:  Pulse 85   Temp 99.1  F (37.3  C)   Resp 16   Ht 1.473 m (4' 10\")   Wt 52.2 kg (115 lb)   SpO2 100%   BMI 24.04 kg/m  GENERAL APPEARANCE: healthy, alert and no distress  EYES: EOMI,  PERRL, conjunctiva clear  HENT: ear canals and TM's normal.  Nose and mouth without ulcers, erythema or lesions  NECK: supple, nontender, no lymphadenopathy  RESP: lungs clear to auscultation - no rales, rhonchi or wheezes  CV: regular rates and rhythm, normal S1 S2, no murmur noted  SKIN: no suspicious lesions or rashes      ICD-10-CM    1. Fever and chills  R50.9 Streptococcus A Rapid Scr w Reflx to PCR     WBC count     Symptomatic COVID-19 Virus (Coronavirus) by PCR     Group A Streptococcus PCR Throat Swab   2. Cough  R05 WBC count     Symptomatic COVID-19 Virus (Coronavirus) by PCR     Quarantine until COVID results  PPE worn  Pt placed in room with mask immediately   Fluids/Rest, f/u if worse/not any better    "

## 2020-08-19 LAB
SARS-COV-2 RNA SPEC QL NAA+PROBE: ABNORMAL
SPECIMEN SOURCE: ABNORMAL

## 2020-08-20 ENCOUNTER — TELEPHONE (OUTPATIENT)
Dept: EMERGENCY MEDICINE | Facility: CLINIC | Age: 25
End: 2020-08-20

## 2020-08-20 ENCOUNTER — NURSE TRIAGE (OUTPATIENT)
Dept: NURSING | Facility: CLINIC | Age: 25
End: 2020-08-20

## 2020-08-20 NOTE — TELEPHONE ENCOUNTER
Pt calling back wanting a copy of her COVID results.    Advised that she can sign up for my chart or she can call medical records.    Pt stated that she will look into both options. Pt told how she can do either option    Latoya Arenas RN    COVID 19 Nurse Triage Plan/Patient Instructions    Please be aware that novel coronavirus (COVID-19) may be circulating in the community. If you develop symptoms such as fever, cough, or SOB or if you have concerns about the presence of another infection including coronavirus (COVID-19), please contact your health care provider or visit [email or web address].     Disposition/Instructions    Home care recommended. Follow home care protocol based instructions.    Thank you for taking steps to prevent the spread of this virus.  o Limit your contact with others.  o Wear a simple mask to cover your cough.  o Wash your hands well and often.    Resources    [facility]: About COVID-19: [email or web address]    CDC: What to Do If You're Sick: www.cdc.gov/coronavirus/2019-ncov/about/steps-when-sick.html    CDC: Ending Home Isolation: www.cdc.gov/coronavirus/2019-ncov/hcp/disposition-in-home-patients.html     CDC: Caring for Someone: www.cdc.gov/coronavirus/2019-ncov/if-you-are-sick/care-for-someone.html     [facility]: Interim Guidance for Hospital Discharge to Home: www.health.[facility].mn.us/diseases/coronavirus/hcp/hospdischarge.pdf    [facility] clinical trials (COVID-19 research studies): clinicalaffairs.[facility]/umn-clinical-trials     Below are the COVID-19 hotlines at the [facility] of Health ([facility]). Interpreters are available.   o For health questions: Call 067-886-0792 or 1-300.196.4916 (7 a.m. to 7 p.m.)  o For questions about schools and childcare: Call 662-466-5794 or 1-850.350.4444 (7 a.m. to 7 p.m.)                   Additional Information    General information question, no triage required and triager able to answer question    Protocols used:  INFORMATION ONLY CALL-A-AH

## 2020-08-20 NOTE — TELEPHONE ENCOUNTER
"Coronavirus (COVID-19) Notification    Caller Name (Patient, parent, daughter/son, grandparent, etc)  Patient     Reason for call  Notify of Positive Coronavirus (COVID-19) lab results, assess symptoms,  review [facility] recommendations    Lab Result    Lab test:  2019-nCoV rRt-PCR or SARS-CoV-2 PCR    Oropharyngeal AND/OR nasopharyngeal swabs is POSITIVE for 2019-nCoV RNA/SARS-COV-2 PCR (COVID-19 virus)    RN Recommendations/Instructions per [facility] Coronavirus COVID-19 recommendations    Brief introduction script  Introduce self then review script:  \"I am calling on behalf of [employer].  We were notified that your Coronavirus test (COVID-19) for was POSITIVE for the virus.  I have some information to relay to you but first I wanted to mention that the [place] Dept of Health will be contacting you shortly [it's possible MD already called Patient] to talk to you more about how you are feeling and other people you have had contact with who might now also have the virus.  Also, [facility] is Partnering with the [facility] for Covid-19 research, you may be contacted directly by research staff.\"    Assessment (Inquire about Patient's current symptoms)   Assessment   Current Symptoms at time of phone call: (if no symptoms, document No symptoms]  None    Symptoms onset (if applicable)  8/17/20     If at time of call, Patients symptoms hare worsened, the Patient should contact 911 or have someone drive them to Emergency Dept promptly:      If Patient calling 911, inform 911 personal that you have tested positive for the Coronavirus (COVID-19).  Place mask on and await 911 to arrive.    If Emergency Dept, If possible, please have another adult drive you to the Emergency Dept but you need to wear mask when in contact with other people.      Review information with Patient    Your result was positive. This means you have COVID-19 (coronavirus).  We have sent you a letter that reviews the " information that I'll be reviewing with you now.    How can I protect others?    If you have symptoms: stay home and away from others (self-isolate) until:    You've had no fever--and no medicine that reduces fever--for 3 full days (72 hours). And      Your other symptoms have gotten better. For example, your cough or breathing has improved. And     At least 10 days have passed since your symptoms started.    If you don't have symptoms: Stay home and away from others (self-isolate) until at least 10 days have passed since your first positive COVID-19 test. (Date test collected)    During this time:    Stay in your own room, including for meals. Use your own bathroom if you can.    Stay away from others in your home. No hugging, kissing or shaking hands. No visitors.     Don't go to work, school or anywhere else.     Clean  high touch  surfaces often (doorknobs, counters, handles, etc.). Use a household cleaning spray or wipes. You'll find a full list on the EPA website at www.epa.gov/pesticide-registration/list-n-disinfectants-use-against-sars-cov-2.     Cover your mouth and nose with a mask, tissue or washcloth to avoid spreading germs.    Wash your hands and face often with soap and water.    Caregivers in these groups are at risk for severe illness due to COVID-19:  o People 65 years and older  o People who live in a nursing home or long-term care facility  o People with chronic disease (lung, heart, cancer, diabetes, kidney, liver, immunologic)  o People who have a weakened immune system, including those who:  - Are in cancer treatment  - Take medicine that weakens the immune system, such as corticosteroids  - Had a bone marrow or organ transplant  - Have an immune deficiency  - Have poorly controlled HIV or AIDS  - Are obese (body mass index of 40 or higher)  - Smoke regularly    Caregivers should wear gloves while washing dishes, handling laundry and cleaning bedrooms and bathrooms.    Wash and dry laundry  with special caution. Don't shake dirty laundry, and use the warmest water setting you can.    If you have a weakened immune system, ask your doctor about other actions you should take.    For more tips, go to www.cdc.gov/coronavirus/2019-ncov/downloads/10Things.pdf.    You should not go back to work until you meet the guidelines above for ending your home isolation. You should meet these along with any other guidelines that your employer has.    Employers: This document serves as formal notice of your employee's medical guidelines for going back to work. They must meet the above guidelines before going back to work in person.    How can I take care of myself?    1. Get lots of rest. Drink extra fluids (unless a doctor has told you not to).    2. Take Tylenol (acetaminophen) for fever or pain. If you have liver or kidney problems, ask your family doctor if it's okay to take Tylenol.     Take either:     650 mg (two 325 mg pills) every 4 to 6 hours, or     1,000 mg (two 500 mg pills) every 8 hours as needed.     Note: Don't take more than 3,000 mg in one day. Acetaminophen is found in many medicines (both prescribed and over-the-counter medicines). Read all labels to be sure you don't take too much.    For children, check the Tylenol bottle for the right dose (based on their age or weight).    3. If you have other health problems (like cancer, heart failure, an organ transplant or severe kidney disease): Call your specialty clinic if you don't feel better in the next 2 days.    4. Know when to call 911: Emergency warning signs include:    Trouble breathing or shortness of breath    Pain or pressure in the chest that doesn't go away    Feeling confused like you haven't felt before, or not being able to wake up    Bluish-colored lips or face    5. Sign up for [employer]. We know it's scary to hear that you have COVID-19. We want to track your symptoms to make sure you're okay over the next 2 weeks. Please look for an  email from [employer]--this is a free, online program that we'll use to keep in touch. To sign up, follow the link in the email. Learn more at [email or web address].    Where can I get more information?     [employer] [place]: [email or web address]    Coronavirus Basics: www.health.[facility].[place]./diseases/coronavirus/basics.html    What to Do If You're Sick: www.cdc.gov/coronavirus/2019-ncov/about/steps-when-sick.html    Ending Home Isolation: www.cdc.gov/coronavirus/2019-ncov/hcp/disposition-in-home-patients.html     Caring for Someone with COVID-19: www.cdc.gov/coronavirus/2019-ncov/if-you-are-sick/care-for-someone.html     [facility] clinical trials (COVID-19 research studies): clinicalaffairs.[facility]-clinical-trials     A Positive COVID-19 letter will be sent via [employer] or the mail.    [Name]  Moe Hightower RN  [employer]  COVID19 Results Team RN  Ph# [phone]

## 2021-01-14 ENCOUNTER — HEALTH MAINTENANCE LETTER (OUTPATIENT)
Age: 26
End: 2021-01-14

## 2021-10-24 ENCOUNTER — HEALTH MAINTENANCE LETTER (OUTPATIENT)
Age: 26
End: 2021-10-24

## 2022-01-26 ENCOUNTER — OFFICE VISIT (OUTPATIENT)
Dept: URGENT CARE | Facility: URGENT CARE | Age: 27
End: 2022-01-26
Payer: COMMERCIAL

## 2022-01-26 VITALS
BODY MASS INDEX: 27.09 KG/M2 | TEMPERATURE: 97.4 F | SYSTOLIC BLOOD PRESSURE: 118 MMHG | RESPIRATION RATE: 16 BRPM | DIASTOLIC BLOOD PRESSURE: 72 MMHG | OXYGEN SATURATION: 100 % | WEIGHT: 129.6 LBS | HEART RATE: 73 BPM

## 2022-01-26 DIAGNOSIS — R59.1 LYMPHADENOPATHY: Primary | ICD-10-CM

## 2022-01-26 LAB — WBC # BLD AUTO: 6.7 10E3/UL (ref 4–11)

## 2022-01-26 PROCEDURE — 85048 AUTOMATED LEUKOCYTE COUNT: CPT | Performed by: PHYSICIAN ASSISTANT

## 2022-01-26 PROCEDURE — 99213 OFFICE O/P EST LOW 20 MIN: CPT | Performed by: PHYSICIAN ASSISTANT

## 2022-01-26 PROCEDURE — 36415 COLL VENOUS BLD VENIPUNCTURE: CPT | Performed by: PHYSICIAN ASSISTANT

## 2022-01-27 NOTE — PROGRESS NOTES
SUBJECTIVE:  Josselyn Latif is a 26 year old female who presents to the clinic today fortender swelling near ear.  Onset of rash was 1 day(s) ago.   Rash is sudden onset.  Location of the rash: face.  Quality/symptoms of rash: tender   Symptoms are mild and rash seems to be stable.  Previous history of a similar rash? No  Recent exposure history: none known    Associated symptoms include: nothing.    No past medical history on file.  Current Outpatient Medications   Medication Sig Dispense Refill     ESTARYLLA 0.25-35 MG-MCG tablet Take 1 tablet by mouth daily        albuterol (PROAIR HFA/PROVENTIL HFA/VENTOLIN HFA) 108 (90 Base) MCG/ACT inhaler Inhale 2 puffs into the lungs every 6 hours (Patient not taking: Reported on 3/10/2020) 6.7 g 0     fluticasone (FLONASE) 50 MCG/ACT nasal spray Spray 2 sprays into both nostrils daily (Patient not taking: Reported on 8/18/2020) 16 g 0     Social History     Tobacco Use     Smoking status: Former Smoker     Smokeless tobacco: Never Used   Substance Use Topics     Alcohol use: Yes     Alcohol/week: 0.0 standard drinks       ROS:  Review of systems negative except as stated above.    EXAM:   /72   Pulse 73   Temp 97.4  F (36.3  C)   Resp 16   Wt 58.8 kg (129 lb 9.6 oz)   SpO2 100%   BMI 27.09 kg/m    GENERAL: alert, no acute distress.  LYMPH: pre-auricular lymphnode  GENERAL APPEARANCE: healthy, alert and no distress  EYES: EOMI,  PERRL, conjunctiva clear  HENT: ear canals and TM's normal.  Nose and mouth without ulcers, erythema or lesions  NECK: supple, non-tender to palpation, no adenopathy noted  RESP: lungs clear to auscultation - no rales, rhonchi or wheezes  CV: regular rates and rhythm, normal S1 S2, no murmur noted      Results for orders placed or performed in visit on 01/26/22   WBC count     Status: Normal   Result Value Ref Range    WBC Count 6.7 4.0 - 11.0 10e3/uL       ASSESSMENT:  (R59.1) Lymphadenopathy  (primary encounter  diagnosis)  Comment:mildly tender, no evidence of infection  Plan: WBC count      Follow up with PCP if symptoms worsen or fail to improve

## 2022-02-13 ENCOUNTER — HEALTH MAINTENANCE LETTER (OUTPATIENT)
Age: 27
End: 2022-02-13

## 2022-02-23 ENCOUNTER — TELEPHONE (OUTPATIENT)
Dept: NURSING | Facility: CLINIC | Age: 27
End: 2022-02-23

## 2022-02-23 ENCOUNTER — OFFICE VISIT (OUTPATIENT)
Dept: URGENT CARE | Facility: URGENT CARE | Age: 27
End: 2022-02-23
Payer: COMMERCIAL

## 2022-02-23 VITALS
SYSTOLIC BLOOD PRESSURE: 118 MMHG | WEIGHT: 115 LBS | BODY MASS INDEX: 24.04 KG/M2 | OXYGEN SATURATION: 98 % | RESPIRATION RATE: 16 BRPM | HEART RATE: 97 BPM | TEMPERATURE: 98.4 F | DIASTOLIC BLOOD PRESSURE: 73 MMHG

## 2022-02-23 DIAGNOSIS — J02.9 VIRAL PHARYNGITIS: Primary | ICD-10-CM

## 2022-02-23 LAB
DEPRECATED S PYO AG THROAT QL EIA: NEGATIVE
GROUP A STREP BY PCR: NOT DETECTED
SARS-COV-2 RNA RESP QL NAA+PROBE: POSITIVE

## 2022-02-23 PROCEDURE — 87651 STREP A DNA AMP PROBE: CPT | Performed by: PHYSICIAN ASSISTANT

## 2022-02-23 PROCEDURE — U0003 INFECTIOUS AGENT DETECTION BY NUCLEIC ACID (DNA OR RNA); SEVERE ACUTE RESPIRATORY SYNDROME CORONAVIRUS 2 (SARS-COV-2) (CORONAVIRUS DISEASE [COVID-19]), AMPLIFIED PROBE TECHNIQUE, MAKING USE OF HIGH THROUGHPUT TECHNOLOGIES AS DESCRIBED BY CMS-2020-01-R: HCPCS | Performed by: PHYSICIAN ASSISTANT

## 2022-02-23 PROCEDURE — 99213 OFFICE O/P EST LOW 20 MIN: CPT | Performed by: PHYSICIAN ASSISTANT

## 2022-02-23 PROCEDURE — U0005 INFEC AGEN DETEC AMPLI PROBE: HCPCS | Performed by: PHYSICIAN ASSISTANT

## 2022-02-23 NOTE — PROGRESS NOTES
URGENT CARE VISIT:    SUBJECTIVE:   Josselyn Latif is a 26 year old female presenting with a chief complaint of sore throat.  Onset was 2 day(s) ago.   She denies the following symptoms: fever, chills, runny nose, stuffy nose and cough - non-productive  Course of illness is same.    Treatment measures tried include Tylenol/Ibuprofen with no relief of symptoms.  Predisposing factors include ill contact: bf.    PMH: History reviewed. No pertinent past medical history.  Allergies: Molds & smuts, Seasonal allergies, and Amoxicillin   Medications:   Current Outpatient Medications   Medication Sig Dispense Refill     albuterol (PROAIR HFA/PROVENTIL HFA/VENTOLIN HFA) 108 (90 Base) MCG/ACT inhaler Inhale 2 puffs into the lungs every 6 hours (Patient not taking: Reported on 3/10/2020) 6.7 g 0     ESTARYLLA 0.25-35 MG-MCG tablet Take 1 tablet by mouth daily        fluticasone (FLONASE) 50 MCG/ACT nasal spray Spray 2 sprays into both nostrils daily (Patient not taking: Reported on 8/18/2020) 16 g 0     Social History:   Social History     Tobacco Use     Smoking status: Former Smoker     Smokeless tobacco: Never Used   Substance Use Topics     Alcohol use: Yes     Alcohol/week: 0.0 standard drinks       ROS:  Review of systems negative except as stated above.    OBJECTIVE:  /73 (BP Location: Right arm, Patient Position: Chair, Cuff Size: Adult Regular)   Pulse 97   Temp 98.4  F (36.9  C) (Oral)   Resp 16   Wt 52.2 kg (115 lb)   SpO2 98%   Breastfeeding No   BMI 24.04 kg/m    GENERAL APPEARANCE: healthy, alert and no distress  EYES: EOMI,  PERRL, conjunctiva clear  HENT: ear canals and TM's normal.  Mildly erythematous oropharynx  NECK: supple, nontender, no lymphadenopathy  RESP: lungs clear to auscultation - no rales, rhonchi or wheezes  CV: regular rates and rhythm, normal S1 S2, no murmur noted  SKIN: no suspicious lesions or rashes    Labs:    Results for orders placed or performed in visit on 02/23/22    Streptococcus A Rapid Scr w Reflx to PCR - Lab Collect     Status: Normal    Specimen: Throat; Swab   Result Value Ref Range    Group A Strep antigen Negative Negative       ASSESSMENT:    ICD-10-CM    1. Viral pharyngitis  J02.9 Symptomatic; Yes; 2/20/2022 COVID-19 Virus (Coronavirus) by PCR Nose     Streptococcus A Rapid Scr w Reflx to PCR - Lab Collect     Group A Streptococcus PCR Throat Swab       PLAN:  Patient Instructions   Patient was educated on the natural course of viral throat infection. Conservative measures discussed including warm fluids, salt water gargles, Lozenges (Cepacol), and over-the-counter analgesics (Tylenol or Ibuprofen). See your primary care provider if symptoms worsen or do not improve in 5 days. Seek emergency care if you develop severe throat pain, or difficulty swallowing.     Patient verbalized understanding and is agreeable to plan. The patient was discharged ambulatory and in stable condition.    Janet Huff PA-C ....................  2/23/2022   11:35 AM

## 2022-02-23 NOTE — PATIENT INSTRUCTIONS
Patient was educated on the natural course of viral throat infection. Conservative measures discussed including warm fluids, salt water gargles, Lozenges (Cepacol), and over-the-counter analgesics (Tylenol or Ibuprofen). See your primary care provider if symptoms worsen or do not improve in 5 days. Seek emergency care if you develop severe throat pain, or difficulty swallowing.

## 2022-02-24 ENCOUNTER — VIRTUAL VISIT (OUTPATIENT)
Dept: URGENT CARE | Facility: CLINIC | Age: 27
End: 2022-02-24
Payer: COMMERCIAL

## 2022-02-24 DIAGNOSIS — U07.1 INFECTION DUE TO 2019 NOVEL CORONAVIRUS: Primary | ICD-10-CM

## 2022-02-24 PROBLEM — B97.7 HPV (HUMAN PAPILLOMA VIRUS) INFECTION: Status: ACTIVE | Noted: 2017-09-01

## 2022-02-24 PROBLEM — R87.612 LGSIL ON PAP SMEAR OF CERVIX: Status: ACTIVE | Noted: 2017-09-02

## 2022-02-24 PROCEDURE — 99213 OFFICE O/P EST LOW 20 MIN: CPT | Mod: 95 | Performed by: PHYSICIAN ASSISTANT

## 2022-02-24 NOTE — TELEPHONE ENCOUNTER
Coronavirus (COVID-19) Notification    Caller Name (Patient, parent, daughter/son, grandparent, etc)  The patient      Reason for call  Notify of Positive Coronavirus (COVID-19) lab results, assess symptoms,  review [facility] recommendations    Lab Result    Lab test:  2019-nCoV rRt-PCR or SARS-CoV-2 PCR    Oropharyngeal AND/OR nasopharyngeal swabs is POSITIVE for 2019-nCoV RNA/SARS-COV-2 PCR (COVID-19 virus)      Gather patient reported symptoms   Assessment   Current Symptoms at time of phone call, reported by patient: (if no symptoms, document: No symptoms] Sore throat   Date of symptom(s) onset (if applicable) 3 days     If at time of call, Patients symptoms have worsened, the Patient should contact 911 or have someone drive them to Emergency Dept promptly:      If Patient calling 911, inform 911 personal that you have tested positive for the Coronavirus (COVID-19).  Place mask on and await 911 to arrive.    If Emergency Dept, If possible, please have another adult drive you to the Emergency Dept but you need to wear mask when in contact with other people.      Treatment Options:   Patient classified as COVID treatment eligible by Epic high risk algorithm: Yes  Is the patient symptomatic at the time of result notification? Yes. Was the onset of symptoms within the last 5 days? Yes.   There are now oral medications available for the treatment of COVID-19.  Taking one of these medications within the first five days of symptoms (when people may not yet feel severely ill) has been shown to make people feel better, prevent them from getting sicker, and preventing hospitalization and death.   Does the patient agree to have a visit with a provider to discuss treatment options? Yes. Is the patient seen at [facility]?  No: Warm transfer caller to [phone] to be scheduled with a virtual urgent provider.  During transfer, instruct  on appropriate time frame for visit (within 5 days of  symptoms onset). If patient is on day 5 and unable to be scheduled, scheduling team will provide [facility] website.     Review information with Patient    Your result was positive. This means you have COVID-19 (coronavirus).    How can I protect others?    These guidelines are for isolating before returning to work, school or .    If you DO have symptoms    Stay home and away from others     For at least 5 days after your symptoms started, AND    You are fever free for 24 hours (with no medicine that reduces fever), AND    Your other symptoms are better    Wear a mask for 10 full days anytime you are around others    If you DON'T have symptoms    Stay home and away from others for at least 5 days after your positive test    Wear a mask for 10 full days anytime you are around others    There may be different guidelines for healthcare facilities.  Please check with the specific sites before arriving.    If you have been told by a doctor that you were severely ill with COVID-19 or are immunocompromised, you should isolate for at least 10 days.    You should not go back to work until you meet the guidelines above for ending your home isolation. You don't need to be retested for COVID-19 before going back to work--studies show that you won't spread the virus if it's been at least 10 days since your symptoms started (or 20 days, if you have a weak immune system).    Employers, schools, and daycares: This is an official notice for this person's medical guidelines for returning in-person.  They must meet the above guidelines before going back to work, school or  in person.    You will receive a positive COVID-19 letter via [employer] or the mail soon with additional self-care information (exception, no letters will be sent to presurgical/preprocedure patients).    Would you like me to review some of that information with you now?  No    If you were tested for an upcoming procedure, please contact your provider for  next steps.    Harriet Garcia LPN

## 2022-02-24 NOTE — PROGRESS NOTES
Assessment:     COVID-19 positive patient.  Encounter for consideration of medication intervention.      Patient does qualify for a prescription.   Full discussion with patient including medication options, risks and benefits.   Potential drug interactions reviewed with patient.      Plan:  Treatment planned -  I discussed both MA and Molnupiravir with patient and she does not want to pursue treatment at this time. If she devlops worsening symptoms she was given information to try to access monoclonal antibodies through [facility].   We reviewed [facility] quarantine guidelines and home cares. She has not other questions or concerns at this time.     Christina Ortiz PA-C  Virtual Urgent Care      Patient preference to obtain AVS:  Sherine Arcos  is a 26 year old  who has a confirmed new positive COVID-19 diagnosis.    She has been identified as high risk for complications of this infection, and is being evaluated via a billable Phone visit.      Phone call duration: 14 minutes    Concern for COVID-19  Exactly how many days ago did these symptoms start? 3 days ago     She is having a bad sore throat and thought she had strep     Are any of the following symptoms significant for you?    New or worsening difficulty breathing? No    Worsening cough? Yes, it's a dry cough.     Fever or chills? Yes, I felt feverish or had chills.    Headache: no    Sore throat: YES    Chest pain: no    Diarrhea: no    Body aches? YES    What treatments has patient tried? Cough syrup   Does patient live in a nursing home, group home, or shelter? no  Does patient have a way to get food/medications during quarantined? Yes, I have a friend or family member who can help me.      MASSBP Score 2/24/2022   Age Greater than or equal to 65 years 0   BMI greater than or equal to 35 kg/m2 0   Has Diabetes Mellitus 0   Has Chronic Kidney Disease 0   Has Cardiovascular Disease and 55 years or older 0   Has Chronic Respiratory Disease and  "55 years or older 0   Has Hypertension and 55 years or older 0   Is Immunocompromised 0   Is Pregnant 0   Member of BIPOC community (Black/, /, ,  or , or  or [place] Native)  2   MASSBP Score 2   Has the patient had a positive COVID test outside our system?  No   What day did symptoms start?  2/21/2022       Constitutional, HEENT, cardiovascular, pulmonary, gi and gu systems are negative, except as otherwise noted.    Objective    Vitals:  No vitals were obtained today due to virtual visit.  Estimated body mass index is 24.04 kg/m  as calculated from the following:    Height as of 8/18/20: 1.473 m (4' 10\").    Weight as of 2/23/22: 52.2 kg (115 lb).   General: Alert, no apparent distress  PSYCH: Alert; coherent speech, normal rate and volume, able to articulate logical thoughts, appropriate insight, no tangential thoughts, no hallucination or delusions.  Affect is appropriate  RESP: No cough, no audible wheezing, able to talk in full sentences  Remainder of exam unable to be completed due to telephone visit  No results found for: GFRESTIMATED             "

## 2022-02-24 NOTE — TELEPHONE ENCOUNTER
Patient classified as COVID treatment eligible by Epic high risk algorithm:  Yes    Coronavirus (COVID-19) Notification    Reason for call  Notify of POSITIVE COVID-19 lab result, assess symptoms,  review [facility] recommendations    Lab Result   Lab test for 2019-nCoV rRt-PCR or SARS-COV-2 PCR  Oropharyngeal AND/OR nasopharyngeal swabs were POSITIVE for 2019-nCoV RNA [OR] SARS-COV-2 RNA (COVID-19) RNA     We have been unable to reach patient by phone at this time to notify of their Positive COVID-19 result.    Left voicemail message requesting a call back to [phone] [facility] for results.        A Positive COVID-19 letter will be sent via [employer] or the mail. (Exception, no letters sent to Presurgerical/Preprocedure Patients)    Christy Hawthorne LPN

## 2022-05-31 ENCOUNTER — OFFICE VISIT (OUTPATIENT)
Dept: FAMILY MEDICINE | Facility: CLINIC | Age: 27
End: 2022-05-31
Payer: COMMERCIAL

## 2022-05-31 VITALS
HEART RATE: 82 BPM | DIASTOLIC BLOOD PRESSURE: 82 MMHG | OXYGEN SATURATION: 100 % | TEMPERATURE: 98 F | BODY MASS INDEX: 25.08 KG/M2 | SYSTOLIC BLOOD PRESSURE: 119 MMHG | WEIGHT: 120 LBS

## 2022-05-31 DIAGNOSIS — R07.0 THROAT PAIN: Primary | ICD-10-CM

## 2022-05-31 LAB
DEPRECATED S PYO AG THROAT QL EIA: NEGATIVE
GROUP A STREP BY PCR: NOT DETECTED

## 2022-05-31 PROCEDURE — 87491 CHLMYD TRACH DNA AMP PROBE: CPT | Performed by: PHYSICIAN ASSISTANT

## 2022-05-31 PROCEDURE — 99203 OFFICE O/P NEW LOW 30 MIN: CPT

## 2022-05-31 PROCEDURE — U0003 INFECTIOUS AGENT DETECTION BY NUCLEIC ACID (DNA OR RNA); SEVERE ACUTE RESPIRATORY SYNDROME CORONAVIRUS 2 (SARS-COV-2) (CORONAVIRUS DISEASE [COVID-19]), AMPLIFIED PROBE TECHNIQUE, MAKING USE OF HIGH THROUGHPUT TECHNOLOGIES AS DESCRIBED BY CMS-2020-01-R: HCPCS | Performed by: PHYSICIAN ASSISTANT

## 2022-05-31 PROCEDURE — 87651 STREP A DNA AMP PROBE: CPT | Performed by: PHYSICIAN ASSISTANT

## 2022-05-31 PROCEDURE — U0005 INFEC AGEN DETEC AMPLI PROBE: HCPCS | Performed by: PHYSICIAN ASSISTANT

## 2022-05-31 PROCEDURE — 87591 N.GONORRHOEAE DNA AMP PROB: CPT | Performed by: PHYSICIAN ASSISTANT

## 2022-05-31 NOTE — PROGRESS NOTES
Chief Complaint   Patient presents with     Pharyngitis     Develop throat pain Friday after doing oral sex        ASSESSMENT/PLAN:  Josselyn was seen today for pharyngitis.    Diagnoses and all orders for this visit:    Throat pain  -     Streptococcus A Rapid Scr w Reflx to PCR  -     Group A Streptococcus PCR Throat Swab  -     Symptomatic; Unknown COVID-19 Virus (Coronavirus) by PCR  -     Neisseria gonorrhoeae PCR  -     Chlamydia trachomatis PCR    Rapid strep negative.  Think this is most likely viral pharyngitis possibly allergies.  We will test for COVID.  Thinks less likely this to be chlamydia or gonorrhea given the acute onset within 24 hours after oral sex usually this takes over a week to incubate.  We will still test for this and treat accordingly.  Exam is within normal limits today.  Follow-up if not improving after 1 week of illness    Rashaad Loyd PA-C      SUBJECTIVE:  Josselyn is a 26 year old female who presents to urgent care with 3 to 4 days of a sore throat.  Seems to be worse if she wakes up in the night.  No pain with swallowing.  Relatively mild throughout the day.  She did participate in oral sex the day before her symptoms began and is concerns her.  It is a new partner.  She denies any discharge from throat.  No vaginal discharge, lesions or redness.  No urinary symptoms.  No fevers or chills, headache, nasal congestion, runny nose, ear pain, shortness of breath, chest pain, cough, nausea, vomiting, diarrhea abdominal pain.  Feels otherwise well    ROS: Pertinent ROS neg other than the symptoms noted above in the HPI.     OBJECTIVE:  /82 (BP Location: Right arm, Patient Position: Chair, Cuff Size: Adult Regular)   Pulse 82   Temp 98  F (36.7  C) (Tympanic)   Wt 54.4 kg (120 lb)   SpO2 100%   BMI 25.08 kg/m     GENERAL: healthy, alert and no distress  EYES: Eyes grossly normal to inspection, PERRL and conjunctivae and sclerae normal  HENT: No significant oropharynx erythema,  lesions or swelling.  Uvula midline  NECK: no adenopathy, nontender, no masses palpated    DIAGNOSTICS    No results found for any visits on 05/31/22.     Current Outpatient Medications   Medication     ESTARYLLA 0.25-35 MG-MCG tablet     albuterol (PROAIR HFA/PROVENTIL HFA/VENTOLIN HFA) 108 (90 Base) MCG/ACT inhaler     fluticasone (FLONASE) 50 MCG/ACT nasal spray     No current facility-administered medications for this visit.      Patient Active Problem List   Diagnosis     HPV (human papilloma virus) infection     LGSIL on Pap smear of cervix     Varicella      History reviewed. No pertinent past medical history.  History reviewed. No pertinent surgical history.  History reviewed. No pertinent family history.  Social History     Tobacco Use     Smoking status: Former Smoker     Smokeless tobacco: Never Used   Substance Use Topics     Alcohol use: Yes     Alcohol/week: 0.0 standard drinks              The plan of care was discussed with the patient. They understand and agree with the course of treatment prescribed. A printed summary was given including instructions and medications.  The use of [email or web address] dictation services may have been used to construct the content in this note; any grammatical or spelling errors are non-intentional. Please contact the author of this note directly if you are in need of any clarification.

## 2022-06-01 LAB
C TRACH DNA SPEC QL NAA+PROBE: NEGATIVE
N GONORRHOEA DNA SPEC QL NAA+PROBE: NEGATIVE
SARS-COV-2 RNA RESP QL NAA+PROBE: NEGATIVE

## 2022-09-06 ENCOUNTER — OFFICE VISIT (OUTPATIENT)
Dept: URGENT CARE | Facility: URGENT CARE | Age: 27
End: 2022-09-06
Payer: COMMERCIAL

## 2022-09-06 VITALS
WEIGHT: 120 LBS | OXYGEN SATURATION: 100 % | TEMPERATURE: 98.3 F | BODY MASS INDEX: 25.08 KG/M2 | RESPIRATION RATE: 20 BRPM | HEART RATE: 69 BPM | DIASTOLIC BLOOD PRESSURE: 70 MMHG | SYSTOLIC BLOOD PRESSURE: 104 MMHG

## 2022-09-06 DIAGNOSIS — N89.8 VAGINAL ITCHING: ICD-10-CM

## 2022-09-06 DIAGNOSIS — N76.0 BACTERIAL VAGINOSIS: Primary | ICD-10-CM

## 2022-09-06 DIAGNOSIS — B96.89 BACTERIAL VAGINOSIS: Primary | ICD-10-CM

## 2022-09-06 LAB
ALBUMIN UR-MCNC: NEGATIVE MG/DL
APPEARANCE UR: CLEAR
BILIRUB UR QL STRIP: NEGATIVE
CLUE CELLS: PRESENT
COLOR UR AUTO: YELLOW
GLUCOSE UR STRIP-MCNC: NEGATIVE MG/DL
HGB UR QL STRIP: NEGATIVE
KETONES UR STRIP-MCNC: NEGATIVE MG/DL
LEUKOCYTE ESTERASE UR QL STRIP: NEGATIVE
NITRATE UR QL: NEGATIVE
PH UR STRIP: 6 [PH] (ref 5–7)
SP GR UR STRIP: 1.01 (ref 1–1.03)
TRICHOMONAS, WET PREP: ABNORMAL
UROBILINOGEN UR STRIP-ACNC: 1 E.U./DL
WBC'S/HIGH POWER FIELD, WET PREP: ABNORMAL
YEAST, WET PREP: ABNORMAL

## 2022-09-06 PROCEDURE — 81003 URINALYSIS AUTO W/O SCOPE: CPT

## 2022-09-06 PROCEDURE — 99213 OFFICE O/P EST LOW 20 MIN: CPT | Performed by: PHYSICIAN ASSISTANT

## 2022-09-06 PROCEDURE — 87210 SMEAR WET MOUNT SALINE/INK: CPT

## 2022-09-06 RX ORDER — FLUCONAZOLE 150 MG/1
TABLET ORAL
Qty: 1 TABLET | Refills: 0 | Status: SHIPPED | OUTPATIENT
Start: 2022-09-06 | End: 2024-07-23

## 2022-09-06 RX ORDER — METRONIDAZOLE 500 MG/1
500 TABLET ORAL 2 TIMES DAILY
Qty: 14 TABLET | Refills: 0 | Status: SHIPPED | OUTPATIENT
Start: 2022-09-06 | End: 2022-09-13

## 2022-09-06 NOTE — PROGRESS NOTES
"Patient presents with:  Vaginal Itching: And itching with \"foul odor\" to discharge for 1 week    (N76.0,  B96.89) Bacterial vaginosis  (primary encounter diagnosis)  Comment:   Plan: metroNIDAZOLE (FLAGYL) 500 MG tablet        Take 1 tablet twice a day for 7 days  (Take with a cookie!)    (N89.8) Vaginal itching  Comment:   Plan: UA Macro with Reflex to Micro and Culture - lab        collect, Wet prep - Clinic Collect, fluconazole        (DIFLUCAN) 150 MG tablet          Take 1 tablet after finishing the metronidazole    SUBJECTIVE:   Josselyn Latif is a 27 year old female who presents today with malodorous vaginal discharge for the past week.  Denies any fever or abdominal pain.  Does complain of vaginal itching as well.        Current Outpatient Medications   Medication Sig Dispense Refill     Multiple Vitamins-Iron (DAILY-MARLENY/IRON/BETA-CAROTENE) TABS TAKE 1 TABLET BY MOUTH DAILY. (Patient not taking: Reported on 10/19/2020) 30 tablet 7     Social History     Tobacco Use     Smoking status: Never Smoker     Smokeless tobacco: Never Used   Substance Use Topics     Alcohol use: Not on file     Family History   Problem Relation Age of Onset     Diabetes Mother      Diabetes Father          ROS:    10 point ROS of systems including Constitutional, Eyes, Respiratory, Cardiovascular, Gastroenterology, Genitourinary, Integumentary, Muscularskeletal, Psychiatric ,neurological were all negative except for pertinent positives noted in my HPI       OBJECTIVE:  /70   Pulse 69   Temp 98.3  F (36.8  C)   Resp 20   Wt 54.4 kg (120 lb)   LMP  (LMP Unknown)   SpO2 100%   BMI 25.08 kg/m    Physical Exam:  GENERAL APPEARANCE: healthy, alert and no distress  ABDOMEN:  soft, nontender, no HSM or masses and bowel sounds normal  GU_female: deferred. Self wet prep done.  SKIN: no suspicious lesions or rashes    Results for orders placed or performed in visit on 09/06/22   UA Macro with Reflex to Micro and Culture - lab " collect     Status: Normal    Specimen: Urine, Midstream   Result Value Ref Range    Color Urine Yellow Colorless, Straw, Light Yellow, Yellow    Appearance Urine Clear Clear    Glucose Urine Negative Negative mg/dL    Bilirubin Urine Negative Negative    Ketones Urine Negative Negative mg/dL    Specific Gravity Urine 1.015 1.003 - 1.035    Blood Urine Negative Negative    pH Urine 6.0 5.0 - 7.0    Protein Albumin Urine Negative Negative mg/dL    Urobilinogen Urine 1.0 0.2, 1.0 E.U./dL    Nitrite Urine Negative Negative    Leukocyte Esterase Urine Negative Negative    Narrative    Microscopic not indicated   Wet prep - Clinic Collect     Status: Abnormal    Specimen: Vagina; Swab   Result Value Ref Range    Trichomonas Absent Absent    Yeast Absent Absent    Clue Cells Present (A) Absent    WBCs/high power field 1+ (A) None

## 2022-09-06 NOTE — PATIENT INSTRUCTIONS
(N76.0,  B96.89) Bacterial vaginosis  (primary encounter diagnosis)  Comment:   Plan: metroNIDAZOLE (FLAGYL) 500 MG tablet        Take 1 tablet twice a day for 7 days  (Take with a cookie!)    (N89.8) Vaginal itching  Comment:   Plan: UA Macro with Reflex to Micro and Culture - lab        collect, Wet prep - Clinic Collect, fluconazole        (DIFLUCAN) 150 MG tablet          Take 1 tablet after finishing the metronidazole    
[facility]

## 2022-10-14 ENCOUNTER — OFFICE VISIT (OUTPATIENT)
Dept: URGENT CARE | Facility: URGENT CARE | Age: 27
End: 2022-10-14
Payer: COMMERCIAL

## 2022-10-14 VITALS
TEMPERATURE: 98 F | DIASTOLIC BLOOD PRESSURE: 72 MMHG | BODY MASS INDEX: 25.92 KG/M2 | RESPIRATION RATE: 18 BRPM | HEART RATE: 72 BPM | OXYGEN SATURATION: 98 % | WEIGHT: 124 LBS | SYSTOLIC BLOOD PRESSURE: 107 MMHG

## 2022-10-14 DIAGNOSIS — N89.8 VAGINAL ITCHING: Primary | ICD-10-CM

## 2022-10-14 LAB
CLUE CELLS: ABNORMAL
TRICHOMONAS, WET PREP: ABNORMAL
WBC'S/HIGH POWER FIELD, WET PREP: ABNORMAL
YEAST, WET PREP: ABNORMAL

## 2022-10-14 PROCEDURE — 99213 OFFICE O/P EST LOW 20 MIN: CPT | Performed by: PHYSICIAN ASSISTANT

## 2022-10-14 PROCEDURE — 87210 SMEAR WET MOUNT SALINE/INK: CPT | Performed by: PHYSICIAN ASSISTANT

## 2022-10-14 RX ORDER — FLUCONAZOLE 150 MG/1
150 TABLET ORAL ONCE
Qty: 1 TABLET | Refills: 1 | Status: SHIPPED | OUTPATIENT
Start: 2022-10-14 | End: 2022-10-14

## 2022-10-15 ENCOUNTER — HEALTH MAINTENANCE LETTER (OUTPATIENT)
Age: 27
End: 2022-10-15

## 2022-10-28 NOTE — PROGRESS NOTES
Assessment & Plan     Vaginal itching    Yeast infection occurs when yeast in the vagina increase and attacks the vaginal tissues. Yeast is a type of fungus. These infections are often caused by a type of yeast called Candida albicans. Other species of yeast can also cause infections. Factors that may make infection more likely include recent antibiotic use, douching, or increased sex. Yeast infections are more common in women who have diabetes, or are obese or pregnant, or have a weak immune system.   Symptoms of yeast infection    Clumpy or thin, white discharge, which may look like cottage cheese    No odor or minimal odor    Severe vaginal itching or burning    Burning with urination    Swelling, redness of vulva    Pain during sex      - Wet prep - Clinic Collect  - fluconazole (DIFLUCAN) 150 MG tablet; Take 1 tablet (150 mg) by mouth once for 1 dose    Review of external notes as documented elsewhere in note       At today's visit with Josselyn Latif , we discussed results, diagnosis, medications and formulated a plan.  We also discussed red flags for immediate return to clinic/ER, as well as indications for follow up with PCP if not improved in 3 days. Patient understood and agreed to plan. Josselyn Latif was discharged with stable vitals and has no further questions.       No follow-ups on file.    Mesfin Braden, [facility], PA-C  [facility]    Crystal Arcos is a 27 year old, presenting for the following health issues:  Urgent Care (Yeast infection )      HPI   Review of Systems   Constitutional, HEENT, cardiovascular, pulmonary, gi and gu systems are negative, except as otherwise noted.      Objective    /72   Pulse 72   Temp 98  F (36.7  C)   Resp 18   Wt 56.2 kg (124 lb)   LMP  (LMP Unknown)   SpO2 98%   BMI 25.92 kg/m    Body mass index is 25.92 kg/m .  Physical Exam   GENERAL: healthy, alert and no distress  ABDOMEN: soft, nontender, no hepatosplenomegaly, no  masses and bowel sounds normal  MS: no gross musculoskeletal defects noted, no edema  SKIN: no suspicious lesions or rashes  NEURO: Normal strength and tone, mentation intact and speech normal  PSYCH: mentation appears normal, affect normal/bright        Results for orders placed or performed in visit on 10/14/22   Wet prep - Clinic Collect     Status: Abnormal    Specimen: Vagina; Swab   Result Value Ref Range    Trichomonas Absent Absent    Yeast Absent Absent    Clue Cells Absent Absent    WBCs/high power field 4+ (A) None

## 2022-11-27 ENCOUNTER — HEALTH MAINTENANCE LETTER (OUTPATIENT)
Age: 27
End: 2022-11-27

## 2022-11-30 ENCOUNTER — OFFICE VISIT (OUTPATIENT)
Dept: URGENT CARE | Facility: URGENT CARE | Age: 27
End: 2022-11-30
Payer: COMMERCIAL

## 2022-11-30 VITALS
DIASTOLIC BLOOD PRESSURE: 60 MMHG | RESPIRATION RATE: 16 BRPM | TEMPERATURE: 96.9 F | WEIGHT: 124 LBS | HEART RATE: 75 BPM | OXYGEN SATURATION: 98 % | SYSTOLIC BLOOD PRESSURE: 95 MMHG | BODY MASS INDEX: 25.92 KG/M2

## 2022-11-30 DIAGNOSIS — T78.40XA ALLERGIC REACTION, INITIAL ENCOUNTER: Primary | ICD-10-CM

## 2022-11-30 PROCEDURE — 99214 OFFICE O/P EST MOD 30 MIN: CPT | Performed by: FAMILY MEDICINE

## 2022-11-30 RX ORDER — CETIRIZINE HYDROCHLORIDE 10 MG/1
10 TABLET ORAL DAILY
Qty: 10 TABLET | Refills: 0 | Status: SHIPPED | OUTPATIENT
Start: 2022-11-30 | End: 2022-12-10

## 2022-11-30 RX ORDER — METHYLPREDNISOLONE 4 MG
TABLET, DOSE PACK ORAL
Qty: 21 TABLET | Refills: 0 | Status: SHIPPED | OUTPATIENT
Start: 2022-11-30 | End: 2024-07-23

## 2022-11-30 NOTE — PROGRESS NOTES
SUBJECTIVE: Josselyn Latif is a 27 year old female presenting with a chief complaint of itchy rash n/v and nasal congestion 2 hrs after getting flu shot.  Onset of symptoms was Nov 1st ago.  Course of illness is waxing and waning.    lmp 2 weeks ago, normal  Treatment measures tried include None tried.    No past medical history on file.  Allergies   Allergen Reactions     Molds & Smuts      Seasonal Allergies      Amoxicillin Rash     Social History     Tobacco Use     Smoking status: Former     Smokeless tobacco: Never   Substance Use Topics     Alcohol use: Yes     Alcohol/week: 0.0 standard drinks       ROS:  SKIN: no rash  GI: no vomiting    OBJECTIVE:  BP 95/60   Pulse 75   Temp 96.9  F (36.1  C) (Tympanic)   Resp 16   Wt 56.2 kg (124 lb)   SpO2 98%   BMI 25.92 kg/m  GENERAL APPEARANCE: healthy, alert and no distress  EYES: EOMI,  PERRL, conjunctiva clear  HENT: ear canals and TM's normal.  Nose and mouth without ulcers, erythema or lesions  RESP: lungs clear to auscultation - no rales, rhonchi or wheezes  CV: regular rates and rhythm, normal S1 S2, no murmur noted  ABDOMEN:  soft, nontender, no HSM or masses and bowel sounds normal  SKIN: no suspicious lesions or rashes      ICD-10-CM    1. Allergic reaction, initial encounter  T78.40XA methylPREDNISolone (MEDROL DOSEPAK) 4 MG tablet therapy pack     cetirizine (ZYRTEC) 10 MG tablet          Fluids/Rest, f/u if worse/not any better

## 2023-09-14 ENCOUNTER — OFFICE VISIT (OUTPATIENT)
Dept: URGENT CARE | Facility: URGENT CARE | Age: 28
End: 2023-09-14
Payer: COMMERCIAL

## 2023-09-14 VITALS
TEMPERATURE: 98.1 F | OXYGEN SATURATION: 97 % | DIASTOLIC BLOOD PRESSURE: 74 MMHG | BODY MASS INDEX: 27.65 KG/M2 | SYSTOLIC BLOOD PRESSURE: 116 MMHG | HEART RATE: 75 BPM | WEIGHT: 132.3 LBS

## 2023-09-14 DIAGNOSIS — H60.92 OTITIS EXTERNA OF LEFT EAR, UNSPECIFIED CHRONICITY, UNSPECIFIED TYPE: ICD-10-CM

## 2023-09-14 DIAGNOSIS — H65.03 BILATERAL ACUTE SEROUS OTITIS MEDIA, RECURRENCE NOT SPECIFIED: Primary | ICD-10-CM

## 2023-09-14 PROCEDURE — 99213 OFFICE O/P EST LOW 20 MIN: CPT | Performed by: FAMILY MEDICINE

## 2023-09-14 RX ORDER — OFLOXACIN 3 MG/ML
5 SOLUTION AURICULAR (OTIC) 2 TIMES DAILY
Qty: 4 ML | Refills: 0 | Status: SHIPPED | OUTPATIENT
Start: 2023-09-14 | End: 2023-09-21

## 2023-09-14 NOTE — PROGRESS NOTES
Assessment & Plan     Bilateral acute serous otitis media, recurrence not specified  Recommended sudafed and loratadine    Otitis externa of left ear, unspecified chronicity, unspecified type  Also incidentally OE  - ofloxacin (FLOXIN) 0.3 % otic solution  Dispense: 4 mL; Refill: 0             No follow-ups on file.    Jai Foley MD  [facility] AHSAN Arcos is a 28 year old female who presents to clinic today for the following health issues:  Chief Complaint   Patient presents with    Otalgia     27 yo F presents with the following  complaint bilateral ear pain feels as though they need to pop  onset Sunday      HPI    Ears feel plugged  Concern about ear infecton  No fever  No cough   No runny nose        Review of Systems        Objective    /74   Pulse 75   Temp 98.1  F (36.7  C)   Wt 60 kg (132 lb 4.8 oz)   SpO2 97%   BMI 27.65 kg/m    Physical Exam  Vitals and nursing note reviewed.   Constitutional:       Appearance: Normal appearance.   HENT:      Right Ear: Tympanic membrane normal.      Left Ear: Tympanic membrane normal.   Cardiovascular:      Rate and Rhythm: Normal rate and regular rhythm.      Pulses: Normal pulses.      Heart sounds: Normal heart sounds.   Pulmonary:      Effort: Pulmonary effort is normal.      Breath sounds: Normal breath sounds.   Neurological:      Mental Status: She is alert.

## 2024-01-07 ENCOUNTER — HEALTH MAINTENANCE LETTER (OUTPATIENT)
Age: 29
End: 2024-01-07

## 2024-02-26 ENCOUNTER — OFFICE VISIT (OUTPATIENT)
Dept: URGENT CARE | Facility: URGENT CARE | Age: 29
End: 2024-02-26
Payer: COMMERCIAL

## 2024-02-26 ENCOUNTER — ANCILLARY PROCEDURE (OUTPATIENT)
Dept: GENERAL RADIOLOGY | Facility: CLINIC | Age: 29
End: 2024-02-26
Attending: PHYSICIAN ASSISTANT
Payer: COMMERCIAL

## 2024-02-26 VITALS
OXYGEN SATURATION: 98 % | RESPIRATION RATE: 16 BRPM | SYSTOLIC BLOOD PRESSURE: 108 MMHG | HEART RATE: 74 BPM | DIASTOLIC BLOOD PRESSURE: 71 MMHG | TEMPERATURE: 97.1 F

## 2024-02-26 DIAGNOSIS — S99.921A FOOT INJURY, RIGHT, INITIAL ENCOUNTER: ICD-10-CM

## 2024-02-26 DIAGNOSIS — S99.921A FOOT INJURY, RIGHT, INITIAL ENCOUNTER: Primary | ICD-10-CM

## 2024-02-26 PROCEDURE — 73630 X-RAY EXAM OF FOOT: CPT | Mod: TC | Performed by: RADIOLOGY

## 2024-02-26 PROCEDURE — 99214 OFFICE O/P EST MOD 30 MIN: CPT | Performed by: PHYSICIAN ASSISTANT

## 2024-02-26 NOTE — PROGRESS NOTES
SUBJECTIVE:  Josselyn Latif is a 28 year old female who comes in with right foot pain.  Patient states that approximately 1 week ago she tripped over her shoe injuring her right foot.  She has been able to walk but is painful.  Has noticed a bruise initially at the top of her foot but has now spread to her toes.  Has been using a wrap along with icing.  States that the pain is improving but concerned due to increased bruising that may be fractured.  She is otherwise in normal state of health.  She denies any numbness or tingling in the lower extremities.    No past medical history on file.  Patient Active Problem List   Diagnosis    HPV (human papilloma virus) infection    LGSIL on Pap smear of cervix    Varicella     Current Outpatient Medications   Medication    albuterol (PROAIR HFA/PROVENTIL HFA/VENTOLIN HFA) 108 (90 Base) MCG/ACT inhaler    ESTARYLLA 0.25-35 MG-MCG tablet    fluconazole (DIFLUCAN) 150 MG tablet    fluticasone (FLONASE) 50 MCG/ACT nasal spray    methylPREDNISolone (MEDROL DOSEPAK) 4 MG tablet therapy pack     No current facility-administered medications for this visit.     Social History     Socioeconomic History    Marital status: Single     Spouse name: Not on file    Number of children: Not on file    Years of education: Not on file    Highest education level: Not on file   Occupational History    Not on file   Tobacco Use    Smoking status: Former    Smokeless tobacco: Never   Substance and Sexual Activity    Alcohol use: Yes     Alcohol/week: 0.0 standard drinks of alcohol    Drug use: No    Sexual activity: Yes   Other Topics Concern    Parent/sibling w/ CABG, MI or angioplasty before 65F 55M? Not Asked   Social History Narrative    Not on file     Social Determinants of Health     Financial Resource Strain: Not on file   Food Insecurity: Not on file   Transportation Needs: Not on file   Physical Activity: Not on file   Stress: Not on file   Social Connections: Not on file   Interpersonal  Safety: Not on file   Housing Stability: Not on file     ROS  negative other than stated above    Exam:  GENERAL APPEARANCE: healthy, alert and no distress  EYES: EOMI,  PERRL  MS: Right foot with localized tenderness over the base of the fifth metatarsal and midfoot.  Able to push down and pull back against resistance.  Toes and ankle are nontender.  No gross deformity is present.  No significant swelling noted  SKIN: Bruising over the lateral aspect of the foot along with midfoot.  NEURO: Normal strength and tone, sensory exam grossly normal, mentation intact and speech normal    Results for orders placed or performed in visit on 02/26/24   XR Foot Right G/E 3 Views     Status: None    Narrative    FOOT THREE VIEWS RIGHT  2/26/2024 3:52 PM     HISTORY: Foot injury, right, initial encounter; pain  COMPARISON: 6/20/2007      Impression    IMPRESSION: No acute fracture or malalignment. There is normal joint  spacing.    JACI VARGAS MD         SYSTEM ID:  [id number]     assessment/plan:  (S99.921A) Foot injury, right, initial encounter  (primary encounter diagnosis)  Comment:   Plan: XR Foot Right G/E 3 Views          Patient with 1 week history of right foot pain after she tripped over her shoe.  X-ray was obtained and there is no clear fracture noted.  Did discuss that the bruising pattern is normal and not unusual to move locations.  Did advise using over-the-counter ibuprofen Tylenol and ice for symptomatic relief.  Did offer walking boot and declines.  Activity as tolerated.  Follow-up with primary if symptoms worsen or new symptoms develop.

## 2024-03-04 ENCOUNTER — OFFICE VISIT (OUTPATIENT)
Dept: URGENT CARE | Facility: URGENT CARE | Age: 29
End: 2024-03-04
Payer: COMMERCIAL

## 2024-03-04 VITALS
HEART RATE: 72 BPM | WEIGHT: 122.8 LBS | TEMPERATURE: 97.2 F | DIASTOLIC BLOOD PRESSURE: 72 MMHG | BODY MASS INDEX: 25.67 KG/M2 | SYSTOLIC BLOOD PRESSURE: 107 MMHG | OXYGEN SATURATION: 99 %

## 2024-03-04 DIAGNOSIS — M62.838 MASSETER MUSCLE SPASM: Primary | ICD-10-CM

## 2024-03-04 PROCEDURE — 99213 OFFICE O/P EST LOW 20 MIN: CPT | Performed by: PHYSICIAN ASSISTANT

## 2024-03-05 NOTE — PROGRESS NOTES
SUBJECTIVE:  Josselyn Latif is a 28 year old female with a 2-day history of left-sided jaw pain.  Pain is currently at a 1 but has been up to a 10 at times.  States that she has some constant throbbing along the jawline.  Hurts to chew on that side.  Does feel like it slightly swollen.  Several days prior she did have episodes of vomiting.  She does have some pain along her TMJ joint on the left.  She is unsure if she grinds or clenches her teeth.  Denies any headache, chest pain, shortness of breath, fevers, nausea or vomiting at this time.    History reviewed. No pertinent past medical history.  Patient Active Problem List   Diagnosis    HPV (human papilloma virus) infection    LGSIL on Pap smear of cervix    Varicella     Current Outpatient Medications   Medication    albuterol (PROAIR HFA/PROVENTIL HFA/VENTOLIN HFA) 108 (90 Base) MCG/ACT inhaler    ESTARYLLA 0.25-35 MG-MCG tablet    fluconazole (DIFLUCAN) 150 MG tablet    fluticasone (FLONASE) 50 MCG/ACT nasal spray    methylPREDNISolone (MEDROL DOSEPAK) 4 MG tablet therapy pack     No current facility-administered medications for this visit.     Social History     Socioeconomic History    Marital status: Single     Spouse name: Not on file    Number of children: Not on file    Years of education: Not on file    Highest education level: Not on file   Occupational History    Not on file   Tobacco Use    Smoking status: Former    Smokeless tobacco: Never   Vaping Use    Vaping Use: Never used   Substance and Sexual Activity    Alcohol use: Yes     Alcohol/week: 0.0 standard drinks of alcohol    Drug use: No    Sexual activity: Yes   Other Topics Concern    Parent/sibling w/ CABG, MI or angioplasty before 65F 55M? Not Asked   Social History Narrative    Not on file     Social Determinants of Health     Financial Resource Strain: Not on file   Food Insecurity: Not on file   Transportation Needs: Not on file   Physical Activity: Not on file   Stress: Not on file    Social Connections: Not on file   Interpersonal Safety: Not on file   Housing Stability: Not on file     ROS  negative other than stated above    Exam:  GENERAL APPEARANCE: healthy, alert and no distress  EYES: EOMI,  PERRL  HENT: TMs and canals clear bilaterally.  Oral mucosa moist with no erythema noted.  Small amount of clicking in the TMJ on the left.  She does have tenderness over the masseter muscle on the left to palpation.  There is no swelling of the parotid gland.  She is able to open her mouth fully.  Teeth appear normal  NECK: no adenopathy, no asymmetry, masses, or scars and thyroid normal to palpation  RESP: lungs clear to auscultation - no rales, rhonchi or wheezes  CV: regular rates and rhythm, normal S1 S2, no S3 or S4 and no murmur, click or rub -  SKIN: no suspicious lesions or rashes    assessment/plan:  (M62.838) Masseter muscle spasm  (primary encounter diagnosis)  Comment:   Plan:   Localized tenderness along the left-sided masseter muscle.  May be due to recent GI vomiting along with the TMJ on the left.  Discussed icing gentle massage along with over-the-counter ibuprofen.  Did offer muscle relaxant and declines at this time.  Will continue to monitor symptoms and follow-up as needed.  Consider bite guard at night for comfort measures

## 2024-07-23 ENCOUNTER — OFFICE VISIT (OUTPATIENT)
Dept: URGENT CARE | Facility: URGENT CARE | Age: 29
End: 2024-07-23
Payer: COMMERCIAL

## 2024-07-23 VITALS
SYSTOLIC BLOOD PRESSURE: 106 MMHG | OXYGEN SATURATION: 99 % | HEART RATE: 64 BPM | DIASTOLIC BLOOD PRESSURE: 68 MMHG | RESPIRATION RATE: 16 BRPM | TEMPERATURE: 97.2 F | WEIGHT: 120 LBS | BODY MASS INDEX: 25.08 KG/M2

## 2024-07-23 DIAGNOSIS — H00.011 HORDEOLUM EXTERNUM OF RIGHT UPPER EYELID: Primary | ICD-10-CM

## 2024-07-23 PROCEDURE — 99213 OFFICE O/P EST LOW 20 MIN: CPT | Performed by: PHYSICIAN ASSISTANT

## 2024-07-23 RX ORDER — ERYTHROMYCIN 5 MG/G
0.5 OINTMENT OPHTHALMIC 4 TIMES DAILY
Qty: 3.5 G | Refills: 0 | Status: SHIPPED | OUTPATIENT
Start: 2024-07-23 | End: 2024-07-30

## 2024-07-23 NOTE — PATIENT INSTRUCTIONS
July 23, 2024  [facility] Plan:    1. Start the antibiotic eye drops  I prescribed for presumed right upper eyelid stye     2. See primary care doctor for a recheck if you do not significantly improve after 3 days of antibiotic eye drops, if symptoms worsen, or if your symptoms do not fully resolve after 7 days of eye antibiotics.     4. Follow-up immediately if you develop any of the below:     Worsening vision  Increasing pain in the eye  Increasing swelling or redness of the eyelid  Redness spreading around the eye

## 2024-07-23 NOTE — PROGRESS NOTES
ASSESSMENT/PLAN:    (H00.011) Hordeolum externum of right upper eyelid  (primary encounter diagnosis)  Plan: erythromycin (ROMYCIN) 5 MG/GM ophthalmic         ointment            July 23, 2024  [facility] Plan:    1. Start the antibiotic eye drops  I prescribed for presumed right upper eyelid stye     2. See primary care doctor for a recheck if you do not significantly improve after 3 days of antibiotic eye drops, if symptoms worsen, or if your symptoms do not fully resolve after 7 days of eye antibiotics.     4. Follow-up immediately if you develop any of the below:     Worsening vision  Increasing pain in the eye  Increasing swelling or redness of the eyelid  Redness spreading around the eye      This progress note has been dictated, with use of voice recognition software. Any grammatical, typographical, or context errors are unintentional and inherent to use of voice recognition software.  -------------------        Chief Complaint   Patient presents with    Eye Problem     2.5 days, right eye-swollen eyelid, itchy         SUBJECTIVE: Josselyn Latif presents to  today for evaluation of right upper eyelid irritation, redness and swelling x 2 days duration, with interval worsening.Seems to be slowly worsening.  Patient has noted a bit more crusting in am only. No active purulent drainage.    No eye pain. No hx of injury or trauma to eye. Denies any change in visual acuity. No use of contact lenses for many months. No fever, chills, no URI symptoms. No other systemic sxs.  Patient states she otherwise feels healthy/well.    No past medical history on file.    Patient Active Problem List   Diagnosis    HPV (human papilloma virus) infection    LGSIL on Pap smear of cervix    Varicella     Current Outpatient Medications   Medication Sig Dispense Refill    ESTARYLLA 0.25-35 MG-MCG tablet Take 1 tablet by mouth daily  (Patient not taking: Reported on 2/26/2024)       No current facility-administered  medications for this visit.         Allergies   Allergen Reactions    Molds & Smuts     Seasonal Allergies     Amoxicillin Rash         OBJECTIVE:   /68   Pulse 64   Temp 97.2  F (36.2  C)   Resp 16   Wt 54.4 kg (120 lb)   SpO2 99%   BMI 25.08 kg/m            GENERAL:  Very pleasant, comfortable and generally well appearing.  HEENT:   EYES:  Suspected hordeolum noted right upper eyelid. Right  lower lid unremarkable. Left upper and lower lids unremarkable. PERRLA, fundi normal. Visual acuity grossly normal. Conjunctiva without infection.  Sclera clear. NO periorbital tenderness, redness or swelling.   Left TM is normal: no effusions, no erythema, and normal landmarks.  Right TM is normal: no effusions, no erythema, and normal landmarks.  Nasal mucosa is normal.  Oropharyngeal exam is normal: no lesions, erythema, adenopathy or exudate.  Neck is supple, FROM with no adenopathy

## 2024-11-27 ENCOUNTER — OFFICE VISIT (OUTPATIENT)
Dept: URGENT CARE | Facility: URGENT CARE | Age: 29
End: 2024-11-27
Payer: COMMERCIAL

## 2024-11-27 VITALS
SYSTOLIC BLOOD PRESSURE: 115 MMHG | RESPIRATION RATE: 16 BRPM | DIASTOLIC BLOOD PRESSURE: 78 MMHG | WEIGHT: 124 LBS | BODY MASS INDEX: 25.92 KG/M2 | TEMPERATURE: 97.5 F | HEART RATE: 71 BPM | OXYGEN SATURATION: 100 %

## 2024-11-27 DIAGNOSIS — J02.9 SORE THROAT: Primary | ICD-10-CM

## 2024-11-27 LAB
DEPRECATED S PYO AG THROAT QL EIA: NEGATIVE
GROUP A STREP BY PCR: NOT DETECTED

## 2024-11-27 PROCEDURE — 87635 SARS-COV-2 COVID-19 AMP PRB: CPT | Performed by: FAMILY MEDICINE

## 2024-11-27 PROCEDURE — 87651 STREP A DNA AMP PROBE: CPT | Performed by: FAMILY MEDICINE

## 2024-11-27 PROCEDURE — 99213 OFFICE O/P EST LOW 20 MIN: CPT | Performed by: FAMILY MEDICINE

## 2024-11-27 NOTE — PROGRESS NOTES
Assessment & Plan     Sore throat    - Streptococcus A Rapid Screen w/Reflex to PCR  - Group A Streptococcus PCR Throat Swab     Strep test negative no signs of peritonsillar abscess.  Given reports of similar episodes where she tested positive for COVID with symptoms of pharyngitis she requested COVID testing, nasal swab collected by me.  Symptomatic management advised.     Strep PCR and COVID PCR are pending at this time    Work letter provided  See AVS summary for additional recommendations reviewed with patient during this visit.       Kenny Lim MD   [facility]    Crystal Arcos is a 29 year old female who presents to clinic today for the following health issues:  Chief Complaint   Patient presents with    Cold Symptoms     C/o swollen lymph nodes x2-3 days     HPI    Patient reporting swollen lymph nodes along with pharyngitis for 2 to 3 days she has not yet developed any cough symptoms.  She has not had any fevers.  She does not work with kids nor does she have children , no direct exposures to COVID or strep.  She still has her tonsils.  In the past has tested positive for COVID twice for similar presentation.  She has not had any fevers.  Patient Active Problem List    Diagnosis Date Noted    LGSIL on Pap smear of cervix 09/02/2017     Priority: Medium     Formatting of this note might be different from the original.  [facility] Review:    History:  11/2016: NILM  08/2017: LSIL  08/2018: LSIL HPV neg  10/2019: AYSE    Plan, per ASCCP guidelines: CYTOLOGY ONLY IN 3 YEARS      HPV (human papilloma virus) infection 09/01/2017     Priority: Medium    Varicella 03/15/2007     Priority: Medium     Formatting of this note might be different from the original.  Varicella Zoster         Current Outpatient Medications   Medication Sig Dispense Refill    ESTARYLLA 0.25-35 MG-MCG tablet Take 1 tablet by mouth daily  (Patient not taking: Reported on 11/27/2024)       No current facility-administered  medications for this visit.           Objective    /78 (BP Location: Right arm, Patient Position: Sitting, Cuff Size: Adult Regular)   Pulse 71   Temp 97.5  F (36.4  C) (Temporal)   Resp 16   Wt 56.2 kg (124 lb)   LMP 11/20/2024 (Exact Date)   SpO2 100%   BMI 25.92 kg/m    Physical Exam   As noted above and including:   GEN: Moist mucous membranes  Throat: 3+ tonsils minimal erythema no trismus uvula midline Mallampati 3  Neck: Shotty lymphadenopathy bilaterally minimal tenderness  Lung exam is unremarkable clear bilaterally without any wheezes or rhonchi  Regular rate and rhythm no murmurs rubs or gallops    Results for orders placed or performed in visit on 11/27/24   Streptococcus A Rapid Screen w/Reflex to PCR     Status: Normal    Specimen: Throat; Swab   Result Value Ref Range    Group A Strep antigen Negative Negative                     The use of Dragon/BackerKit dictation services may have been used to construct the content in this note; any grammatical or spelling errors are non-intentional. Please contact the author of this note directly if you are in need of any clarification.

## 2024-11-27 NOTE — LETTER
November 27, 2024      Josselyn Latif  [id number]   [facility] [id number]        To Whom It May Concern:    Josselyn Latif was seen in our clinic for an illness we are waiting for the final results on tests in the meantime she should quarantine please excuse from missed work 11/27-11/29/24  Sincerely,        Kenny Lim MD

## 2024-11-27 NOTE — PATIENT INSTRUCTIONS
Tylenol and/or ibuprofen every 4 hours as needed for the sore throat along with throat lozenges      If COVID PCR or strep PCR returns positive we will call you       If your symptoms worsen please seek help right away

## 2024-11-28 ENCOUNTER — TELEPHONE (OUTPATIENT)
Dept: NURSING | Facility: CLINIC | Age: 29
End: 2024-11-28
Payer: COMMERCIAL

## 2024-11-28 LAB — SARS-COV-2 RNA RESP QL NAA+PROBE: POSITIVE

## 2024-11-28 NOTE — TELEPHONE ENCOUNTER
Patient classified as COVID treatment eligible by Epic high risk algorithm:  Yes    Coronavirus (COVID-19) Notification    Reason for call  Notify of POSITIVE COVID-19 lab result, assess symptoms,  review [facility] recommendations    Lab Result   Lab test for 2019-nCoV rRt-PCR or SARS-COV-2 PCR  Oropharyngeal AND/OR nasopharyngeal swabs were POSITIVE for 2019-nCoV RNA [OR] SARS-COV-2 RNA (COVID-19) RNA     We have been unable to reach patient by phone at this time to notify of their Positive COVID-19 result.    Left voicemail message requesting a call back to [phone] [facility] for results.        A Positive COVID-19 letter will be sent via adicate timeads or the mail.    Susan Boyd

## 2025-06-14 ENCOUNTER — HEALTH MAINTENANCE LETTER (OUTPATIENT)
Age: 30
End: 2025-06-14